# Patient Record
Sex: MALE | Race: WHITE | NOT HISPANIC OR LATINO | Employment: FULL TIME | URBAN - METROPOLITAN AREA
[De-identification: names, ages, dates, MRNs, and addresses within clinical notes are randomized per-mention and may not be internally consistent; named-entity substitution may affect disease eponyms.]

---

## 2020-06-16 ENCOUNTER — HOSPITAL ENCOUNTER (EMERGENCY)
Facility: HOSPITAL | Age: 30
Discharge: HOME | End: 2020-06-16
Attending: EMERGENCY MEDICINE
Payer: COMMERCIAL

## 2020-06-16 VITALS
DIASTOLIC BLOOD PRESSURE: 88 MMHG | HEART RATE: 99 BPM | TEMPERATURE: 98 F | SYSTOLIC BLOOD PRESSURE: 136 MMHG | BODY MASS INDEX: 22.96 KG/M2 | WEIGHT: 155 LBS | RESPIRATION RATE: 20 BRPM | HEIGHT: 69 IN | OXYGEN SATURATION: 99 %

## 2020-06-16 DIAGNOSIS — A08.4 VIRAL GASTROENTERITIS: Primary | ICD-10-CM

## 2020-06-16 LAB
ALBUMIN SERPL BCP-MCNC: 4.5 G/DL (ref 3.5–5.2)
ALP SERPL-CCNC: 67 U/L (ref 55–135)
ALT SERPL W/O P-5'-P-CCNC: 31 U/L (ref 10–44)
ANION GAP SERPL CALC-SCNC: 12 MMOL/L (ref 8–16)
AST SERPL-CCNC: 26 U/L (ref 10–40)
BASOPHILS # BLD AUTO: 0.03 K/UL (ref 0–0.2)
BASOPHILS NFR BLD: 0.2 % (ref 0–1.9)
BILIRUB SERPL-MCNC: 1.2 MG/DL (ref 0.1–1)
BUN SERPL-MCNC: 11 MG/DL (ref 6–20)
CALCIUM SERPL-MCNC: 9.2 MG/DL (ref 8.7–10.5)
CHLORIDE SERPL-SCNC: 98 MMOL/L (ref 95–110)
CO2 SERPL-SCNC: 25 MMOL/L (ref 23–29)
CREAT SERPL-MCNC: 0.9 MG/DL (ref 0.5–1.4)
DIFFERENTIAL METHOD: ABNORMAL
EOSINOPHIL # BLD AUTO: 0 K/UL (ref 0–0.5)
EOSINOPHIL NFR BLD: 0.1 % (ref 0–8)
ERYTHROCYTE [DISTWIDTH] IN BLOOD BY AUTOMATED COUNT: 12 % (ref 11.5–14.5)
EST. GFR  (AFRICAN AMERICAN): >60 ML/MIN/1.73 M^2
EST. GFR  (NON AFRICAN AMERICAN): >60 ML/MIN/1.73 M^2
GLUCOSE SERPL-MCNC: 97 MG/DL (ref 70–110)
HCT VFR BLD AUTO: 45.6 % (ref 40–54)
HGB BLD-MCNC: 15.8 G/DL (ref 14–18)
IMM GRANULOCYTES # BLD AUTO: 0.03 K/UL (ref 0–0.04)
IMM GRANULOCYTES NFR BLD AUTO: 0.2 % (ref 0–0.5)
LIPASE SERPL-CCNC: 70 U/L (ref 4–60)
LYMPHOCYTES # BLD AUTO: 1.5 K/UL (ref 1–4.8)
LYMPHOCYTES NFR BLD: 11.3 % (ref 18–48)
MAGNESIUM SERPL-MCNC: 1.9 MG/DL (ref 1.6–2.6)
MCH RBC QN AUTO: 30.4 PG (ref 27–31)
MCHC RBC AUTO-ENTMCNC: 34.6 G/DL (ref 32–36)
MCV RBC AUTO: 88 FL (ref 82–98)
MONOCYTES # BLD AUTO: 1.3 K/UL (ref 0.3–1)
MONOCYTES NFR BLD: 10 % (ref 4–15)
NEUTROPHILS # BLD AUTO: 10.1 K/UL (ref 1.8–7.7)
NEUTROPHILS NFR BLD: 78.2 % (ref 38–73)
NRBC BLD-RTO: 0 /100 WBC
PLATELET # BLD AUTO: 256 K/UL (ref 150–350)
PMV BLD AUTO: 9.5 FL (ref 9.2–12.9)
POTASSIUM SERPL-SCNC: 3.7 MMOL/L (ref 3.5–5.1)
PROT SERPL-MCNC: 7.5 G/DL (ref 6–8.4)
RBC # BLD AUTO: 5.19 M/UL (ref 4.6–6.2)
SODIUM SERPL-SCNC: 135 MMOL/L (ref 136–145)
WBC # BLD AUTO: 12.95 K/UL (ref 3.9–12.7)

## 2020-06-16 PROCEDURE — 25000003 PHARM REV CODE 250: Performed by: NURSE PRACTITIONER

## 2020-06-16 PROCEDURE — 99284 EMERGENCY DEPT VISIT MOD MDM: CPT | Mod: 25

## 2020-06-16 PROCEDURE — 80053 COMPREHEN METABOLIC PANEL: CPT

## 2020-06-16 PROCEDURE — 83735 ASSAY OF MAGNESIUM: CPT

## 2020-06-16 PROCEDURE — 96360 HYDRATION IV INFUSION INIT: CPT

## 2020-06-16 PROCEDURE — 85025 COMPLETE CBC W/AUTO DIFF WBC: CPT

## 2020-06-16 PROCEDURE — 83690 ASSAY OF LIPASE: CPT

## 2020-06-16 RX ORDER — LOPERAMIDE HYDROCHLORIDE 2 MG/1
2 CAPSULE ORAL 4 TIMES DAILY PRN
Qty: 12 CAPSULE | Refills: 0 | Status: SHIPPED | OUTPATIENT
Start: 2020-06-16 | End: 2020-06-26

## 2020-06-16 RX ORDER — ONDANSETRON 4 MG/1
8 TABLET, FILM COATED ORAL 2 TIMES DAILY
COMMUNITY
End: 2020-06-16

## 2020-06-16 RX ORDER — ONDANSETRON 4 MG/1
4 TABLET, FILM COATED ORAL EVERY 6 HOURS PRN
Qty: 12 TABLET | Refills: 0 | Status: ON HOLD | OUTPATIENT
Start: 2020-06-16 | End: 2023-03-06

## 2020-06-16 RX ADMIN — SODIUM CHLORIDE 1000 ML: 0.9 INJECTION, SOLUTION INTRAVENOUS at 07:06

## 2020-06-16 NOTE — Clinical Note
Jg Rich was seen and treated in our emergency department on 6/16/2020.  He may return to work on 06/18/2020.       If you have any questions or concerns, please don't hesitate to call.      Chandra Warren, NP

## 2020-06-16 NOTE — ED PROVIDER NOTES
Encounter Date: 6/16/2020       History     Chief Complaint   Patient presents with    Diarrhea     Patient complaining of NVD that started today.    Vomiting     28yo male presents w/ c/o n/v/d that started around 3:30AM today; explains that he was treated at a local urgent care for constipation over the weekend, denies exac factors otherwise, denies allev factors    Denies fever, headache, dizziness, syncope, vision changes, neck pain, painful/difficult swallowing, chest pain, shortness breath, cough, abdominal pain, hematuria/dysuria    No previous evaluation has been performed nor has PCP been contacted for today's concerns    Past medical/surgical history, allergies & current medications reviewed with patient    Known SARS-CoV2 exposure:  No    The history is provided by the patient. No  was used.     Review of patient's allergies indicates:  No Known Allergies  History reviewed. No pertinent past medical history.  Past Surgical History:   Procedure Laterality Date    APPENDECTOMY      TONSILLECTOMY      TYMPANOSTOMY TUBE PLACEMENT       History reviewed. No pertinent family history.  Social History     Tobacco Use    Smoking status: Current Every Day Smoker   Substance Use Topics    Alcohol use: Never     Frequency: Never    Drug use: Yes     Types: Marijuana     Review of Systems   Constitutional: Negative for fever.   HENT: Negative for sore throat.    Respiratory: Negative for shortness of breath.    Cardiovascular: Negative for chest pain.   Gastrointestinal: Positive for diarrhea, nausea and vomiting. Negative for abdominal distention, abdominal pain and constipation.   Genitourinary: Negative for dysuria.   Musculoskeletal: Negative for back pain.   Skin: Negative for rash.   Neurological: Negative for weakness.   Hematological: Does not bruise/bleed easily.   All other systems reviewed and are negative.      Physical Exam     Initial Vitals [06/16/20 1846]   BP Pulse Resp  Temp SpO2   136/88 99 20 98.1 °F (36.7 °C) 99 %      MAP       --         Physical Exam    Nursing note and vitals reviewed.  Constitutional: He appears well-developed. He does not appear ill. No distress.   AF, VSS   HENT:   Head: Normocephalic.   Right Ear: External ear normal.   Left Ear: External ear normal.   Nose: Nose normal.   Eyes: Lids are normal.   Neck: Neck supple.   Cardiovascular: Normal rate.   Pulmonary/Chest: Effort normal. No respiratory distress.   Abdominal: Soft. Bowel sounds are normal. He exhibits no distension. There is no abdominal tenderness.   Neurological: He is alert.   Skin: No rash noted.   Psychiatric: He has a normal mood and affect.         ED Course   Procedures  Labs Reviewed   CBC W/ AUTO DIFFERENTIAL - Abnormal; Notable for the following components:       Result Value    WBC 12.95 (*)     Gran # (ANC) 10.1 (*)     Mono # 1.3 (*)     Gran% 78.2 (*)     Lymph% 11.3 (*)     All other components within normal limits   COMPREHENSIVE METABOLIC PANEL - Abnormal; Notable for the following components:    Sodium 135 (*)     Total Bilirubin 1.2 (*)     All other components within normal limits   LIPASE - Abnormal; Notable for the following components:    Lipase 70 (*)     All other components within normal limits   MAGNESIUM          Imaging Results    None          Medical Decision Making:   ED Management:  Lab results reviewed:  No critical findings    Medications given:  IVFs    Findings and plan of care discussed with patient:  Viral gastroenteritis; will Rx Zofran & Imodium, care instructions given -- further instructions given to follow-up with PCP    All questions answered, strict return precautions given, patient verbalized understanding to all instructions, pleasant visit -- vital signs stable, patient is in no distress at discharge    Disclaimer:  This note was prepared with [employer] voice recognition transcription software. Garbled syntax, mangled pronouns, and  other bizarre constructions may be attributed to that software system.                                   Clinical Impression:       ICD-10-CM ICD-9-CM   1. Viral gastroenteritis  A08.4 008.8             ED Disposition Condition    Discharge Stable        ED Prescriptions     Medication Sig Dispense Start Date End Date Auth. Provider    ondansetron (ZOFRAN) 4 MG tablet Take 1 tablet (4 mg total) by mouth every 6 (six) hours as needed for Nausea. 12 tablet 6/16/2020  Chandra Warren NP    loperamide (IMODIUM) 2 mg capsule Take 1 capsule (2 mg total) by mouth 4 (four) times daily as needed for Diarrhea. 12 capsule 6/16/2020 6/26/2020 Chandra Warren NP        Follow-up Information     Follow up With Specialties Details Why Contact Info    PCP  Go to  In 2-3 days if symptoms not improving                                      Chandra Warren NP  06/16/20 1932

## 2020-06-17 NOTE — DISCHARGE INSTRUCTIONS
Take all medications as prescribed.  Follow-up with your primary care provider as discussed.  Please remember that you had a visit to the emergency room today and this does not substitute as primary care services for ongoing management because emergency services is a snap shot in time.  Should you have any worsening condition that requires emergency services do not hesitate to return to the ER.    COVID-19 TESTING  IF YOU DESIRE TO BE TESTED, CALL TO SCHEDULE AN APPOINTMENT:  Hot Line [phone]  [address] [place] [address], [place] [id number]  [facility]  Hours 8am-5pm Monday Through Friday

## 2022-06-28 ENCOUNTER — CLINICAL SUPPORT (OUTPATIENT)
Dept: INTERNAL MEDICINE | Facility: CLINIC | Age: 32
End: 2022-06-28

## 2022-06-28 ENCOUNTER — HOSPITAL ENCOUNTER (OUTPATIENT)
Dept: RADIOLOGY | Facility: HOSPITAL | Age: 32
Discharge: HOME | End: 2022-06-28
Attending: PHYSICIAN ASSISTANT

## 2022-06-28 DIAGNOSIS — Z02.1 PRE-EMPLOYMENT EXAMINATION: Primary | ICD-10-CM

## 2022-06-28 PROCEDURE — 71045 X-RAY EXAM CHEST 1 VIEW: CPT | Mod: TC,FY,PN

## 2022-06-28 PROCEDURE — 94010 BREATHING CAPACITY TEST: CPT | Mod: ,,, | Performed by: PHYSICIAN ASSISTANT

## 2022-06-28 PROCEDURE — 99499 UNLISTED E&M SERVICE: CPT | Mod: ,,, | Performed by: PHYSICIAN ASSISTANT

## 2022-06-28 PROCEDURE — 99080 SPECIAL REPORTS OR FORMS: CPT | Mod: ,,, | Performed by: PHYSICIAN ASSISTANT

## 2022-06-28 PROCEDURE — 92552 PURE TONE AUDIOMETRY AIR: CPT | Mod: ,,, | Performed by: PHYSICIAN ASSISTANT

## 2022-06-28 PROCEDURE — 99499 PHYSICAL, BASIC COMPLEXITY: ICD-10-PCS | Mod: ,,, | Performed by: PHYSICIAN ASSISTANT

## 2022-06-28 PROCEDURE — 94010 PULMONARY FUNCTION SCREENING (OCC MED PHYSICALS): ICD-10-PCS | Mod: ,,, | Performed by: PHYSICIAN ASSISTANT

## 2022-06-28 PROCEDURE — 71045 XR CHEST 1 VIEW: ICD-10-PCS | Mod: 26,,, | Performed by: RADIOLOGY

## 2022-06-28 PROCEDURE — 71045 X-RAY EXAM CHEST 1 VIEW: CPT | Mod: 26,,, | Performed by: RADIOLOGY

## 2022-06-28 PROCEDURE — 92552 AUDIOGRAM OCC MED: ICD-10-PCS | Mod: ,,, | Performed by: PHYSICIAN ASSISTANT

## 2022-06-28 PROCEDURE — 99080 OSHA QUESTIONNAIRE: ICD-10-PCS | Mod: ,,, | Performed by: PHYSICIAN ASSISTANT

## 2022-10-20 ENCOUNTER — OCCUPATIONAL HEALTH (OUTPATIENT)
Dept: URGENT CARE | Facility: CLINIC | Age: 32
End: 2022-10-20
Payer: COMMERCIAL

## 2022-10-20 VITALS
WEIGHT: 185 LBS | BODY MASS INDEX: 27.4 KG/M2 | OXYGEN SATURATION: 100 % | SYSTOLIC BLOOD PRESSURE: 128 MMHG | HEIGHT: 69 IN | HEART RATE: 90 BPM | DIASTOLIC BLOOD PRESSURE: 88 MMHG

## 2022-10-20 DIAGNOSIS — Z13.9 ENCOUNTER FOR SCREENING: Primary | ICD-10-CM

## 2022-10-20 PROCEDURE — 99002 MASK FIT-QUANTITATIVE: ICD-10-PCS | Mod: ,,, | Performed by: PHYSICIAN ASSISTANT

## 2022-10-20 PROCEDURE — 99002 DEVICE HANDLING PHYS/QHP: CPT | Mod: ,,, | Performed by: PHYSICIAN ASSISTANT

## 2023-01-24 ENCOUNTER — OFFICE VISIT (OUTPATIENT)
Dept: UROLOGY | Facility: CLINIC | Age: 33
End: 2023-01-24
Payer: COMMERCIAL

## 2023-01-24 ENCOUNTER — TELEPHONE (OUTPATIENT)
Dept: UROLOGY | Facility: CLINIC | Age: 33
End: 2023-01-24
Payer: COMMERCIAL

## 2023-01-24 VITALS
BODY MASS INDEX: 28 KG/M2 | SYSTOLIC BLOOD PRESSURE: 128 MMHG | DIASTOLIC BLOOD PRESSURE: 81 MMHG | HEART RATE: 64 BPM | HEIGHT: 69 IN | WEIGHT: 189.06 LBS

## 2023-01-24 DIAGNOSIS — Z30.09 STERILIZATION CONSULT: Primary | ICD-10-CM

## 2023-01-24 PROCEDURE — 1159F PR MEDICATION LIST DOCUMENTED IN MEDICAL RECORD: ICD-10-PCS | Mod: CPTII,S$GLB,, | Performed by: UROLOGY

## 2023-01-24 PROCEDURE — 99204 PR OFFICE/OUTPT VISIT, NEW, LEVL IV, 45-59 MIN: ICD-10-PCS | Mod: S$GLB,,, | Performed by: UROLOGY

## 2023-01-24 PROCEDURE — 99999 PR PBB SHADOW E&M-EST. PATIENT-LVL IV: CPT | Mod: PBBFAC,,, | Performed by: UROLOGY

## 2023-01-24 PROCEDURE — 3079F DIAST BP 80-89 MM HG: CPT | Mod: CPTII,S$GLB,, | Performed by: UROLOGY

## 2023-01-24 PROCEDURE — 1160F RVW MEDS BY RX/DR IN RCRD: CPT | Mod: CPTII,S$GLB,, | Performed by: UROLOGY

## 2023-01-24 PROCEDURE — 3079F PR MOST RECENT DIASTOLIC BLOOD PRESSURE 80-89 MM HG: ICD-10-PCS | Mod: CPTII,S$GLB,, | Performed by: UROLOGY

## 2023-01-24 PROCEDURE — 3008F PR BODY MASS INDEX (BMI) DOCUMENTED: ICD-10-PCS | Mod: CPTII,S$GLB,, | Performed by: UROLOGY

## 2023-01-24 PROCEDURE — 3074F SYST BP LT 130 MM HG: CPT | Mod: CPTII,S$GLB,, | Performed by: UROLOGY

## 2023-01-24 PROCEDURE — 1159F MED LIST DOCD IN RCRD: CPT | Mod: CPTII,S$GLB,, | Performed by: UROLOGY

## 2023-01-24 PROCEDURE — 3008F BODY MASS INDEX DOCD: CPT | Mod: CPTII,S$GLB,, | Performed by: UROLOGY

## 2023-01-24 PROCEDURE — 99999 PR PBB SHADOW E&M-EST. PATIENT-LVL IV: ICD-10-PCS | Mod: PBBFAC,,, | Performed by: UROLOGY

## 2023-01-24 PROCEDURE — 1160F PR REVIEW ALL MEDS BY PRESCRIBER/CLIN PHARMACIST DOCUMENTED: ICD-10-PCS | Mod: CPTII,S$GLB,, | Performed by: UROLOGY

## 2023-01-24 PROCEDURE — 99204 OFFICE O/P NEW MOD 45 MIN: CPT | Mod: S$GLB,,, | Performed by: UROLOGY

## 2023-01-24 PROCEDURE — 3074F PR MOST RECENT SYSTOLIC BLOOD PRESSURE < 130 MM HG: ICD-10-PCS | Mod: CPTII,S$GLB,, | Performed by: UROLOGY

## 2023-01-24 RX ORDER — LIDOCAINE AND PRILOCAINE 25; 25 MG/G; MG/G
CREAM TOPICAL
Qty: 30 G | Refills: 0 | Status: ON HOLD | OUTPATIENT
Start: 2023-01-24 | End: 2023-03-06 | Stop reason: HOSPADM

## 2023-01-24 RX ORDER — SULFAMETHOXAZOLE AND TRIMETHOPRIM 800; 160 MG/1; MG/1
1 TABLET ORAL 2 TIMES DAILY
Qty: 10 TABLET | Refills: 0 | Status: SHIPPED | OUTPATIENT
Start: 2023-01-24 | End: 2023-01-27

## 2023-01-24 RX ORDER — LIDOCAINE HYDROCHLORIDE 10 MG/ML
10 INJECTION INFILTRATION; PERINEURAL ONCE
Status: CANCELLED | OUTPATIENT
Start: 2023-01-24 | End: 2023-01-24

## 2023-01-24 RX ORDER — ACETAMINOPHEN 500 MG
1000 TABLET ORAL
Status: CANCELLED | OUTPATIENT
Start: 2023-01-24

## 2023-01-24 RX ORDER — LORAZEPAM 0.5 MG/1
1 TABLET ORAL
Status: DISCONTINUED | OUTPATIENT
Start: 2023-01-24 | End: 2023-03-02

## 2023-01-24 NOTE — TELEPHONE ENCOUNTER
----- Message from Kirsten Moreno sent at 1/24/2023  1:05 PM CST -----  Contact: self  Type:  Needs Medical Advice    Who Called: self    Would the patient rather a call back or a response via [facility]? call  Best Call Back Number: [phone] (home)     Additional Information: pt said they might be a lil late to appt. Please advise and thank you.

## 2023-01-24 NOTE — PROGRESS NOTES
Chief Complaint: Desired infertility  PCP: Primary Doctor No  Date of Service: 01/24/2023        Jg Rich is a 32 y.o. male who desires to have a vasectomy.    He has 3 children, ages 3,2 and one on the way.  He is .  His wife is 30 old. Wife's form of contraception: none    He is certain he does not want further children.  He is aware of other forms of contraception and understands that vasectomy should be considered permanent.    He has no other urologic complaints none  Any history of skin abscesses: staph infection on elbow good.   Any history of diabetes: no, No results found for: LABA1C, HGBA1C    Work activity level/Occupation: . Walking and stairs.   Anticoagulation/BC powder/Goody's Powder: takes occasionally.     Father with h/o stones. Pt no h/o stones.       Review of Systems: neg     PHYSICAL EXAM:  Vitals:    01/24/23 1334   BP: 128/81   Pulse: 64       Genitourinary: Penis is normal with no evidence of plaques or induration. Urethral meatus is normal.  Scrotum is normal. Testes are descended bilaterally with no evidence of abnormal masses or tenderness. Epididymis and cord structures are normal bilaterally.  Vas: bilateral vas are easily palpable     Assesment:   Jg Rich is a 32 y.o. male with     1. Sterilization consult          Plan:     Special notes about this patient/issues unrelated to vasectomy/follow-up  Scheduled for vas on march 6  Emla cream - bring with you - apply 15 min before procedure, once yiou get there.   Bactrim (sulfa)twice a day x 5d starting night before. Take with probiotic. H/o staph as a child.   Glucose normal on bmp in 2020. Do not need to repeat.   Ativan 1mg preop.        His vasectomy is scheduled at the ambulatory surgical center for Monday, march 6.  The nurses from the ASC call him the Friday before to tell him the time to be there for Monday.  He will need someone to drive him.   He does not have to fast for the  procedure    He is not on any anticoagulation/aspirin.   He will be given ativan 1mg 30 minutes prior to procedure  He will start antibiotics (bactrim)  the night before and take twice a day for 5 days. While on doxycycline he was told he should remain out of the sun or at least were long sleeves in long pants were with sun block.     He was counseled to take off at least 4-7 days of work and avoid any strenuous activity    1.The risks and benefits of vasectomy were discussed with the patient in detail.  The risks include bleeding, infection, pain, scarring, chronic pain, sperm granuloma, recannulization (1/2000) and loss of testicular function. We discussed risk of not obtaining both vas (one from right and one from left and that the specimen will be sent for pathology). If one of the specimen is not the vas may need another procedure to find the vas. The patient was informed that the long term effects of vasectomy are unknown and could be associated with a higher risk of prostate cancer. We discussed that vasovasotomy does not guarantee return of fertility if he decides at any point to do a reversal.   2.We discussed that he should stop aspirin, fish oil, and any other blood thinners at least one week prior to the procedure. Acetominophen is okay to use for headaches, pain, etc.  3. We discussed that he must continue to use contraception until a negative semen analysis is obtained 8 weeks later or 20 ejaculates later, whichever is first. He will bring the semen sample to the hospital. If the first one is negative, will not repeat.  If it shows sperm he may need to repeat a few weeks later. He should receive a cup and instructions on how to provide the semen analysis prior to discharge from the ASC/vasectomy.  4. A 1 week follow-up will be made but if he is doing well he can cancel this appointment.     Prescriptions to be given:   bactrim (antibiotic) - to be prescribed before procedure  I will also prescribe  bactrim  twice a day starting the morning of the procedure before he comes in for 5 days.   Norco 5/325 (pain medicine) - will give after procedure  [place] 5/325, disp 10.  Alternate Tylenol and ibuprofen after the procedure for pain.  If pain persists then take Norco for breakthrough pain.

## 2023-01-24 NOTE — PATIENT INSTRUCTIONS
Special notes about this patient/issues unrelated to vasectomy/follow-up  Scheduled for vas on march 6  Emla cream - bring with you - apply 15 min before procedure, once yiou get there.   Bactrim (sulfa)twice a day x 5d starting night before. Take with probiotic. H/o staph as a child.   Glucose normal on bmp in 2020. Do not need to repeat.   Ativan 1mg preop.    His vasectomy is scheduled at the ambulatory surgical center for Monday, march 6.  The nurses from the [facility] call him the Friday before to tell him the time to be there for Monday.  He will need someone to drive him.   He does not have to fast for the procedure    He is not on any anticoagulation/aspirin.   He will be given ativan 1mg 30 minutes prior to procedure  He will start antibiotics (bactrim)  the night before and take twice a day for 5 days. While on doxycycline he was told he should remain out of the sun or at least were long sleeves in long pants were with sun block.     He was counseled to take off at least 4-7 days of work and avoid any strenuous activity    1.The risks and benefits of vasectomy were discussed with the patient in detail.  The risks include bleeding, infection, pain, scarring, chronic pain, sperm granuloma, recannulization (1/2000) and loss of testicular function. We discussed risk of not obtaining both vas (one from right and one from left and that the specimen will be sent for pathology). If one of the specimen is not the vas may need another procedure to find the vas. The patient was informed that the long term effects of vasectomy are unknown and could be associated with a higher risk of prostate cancer. We discussed that vasovasotomy does not guarantee return of fertility if he decides at any point to do a reversal.   2.We discussed that he should stop aspirin, fish oil, and any other blood thinners at least one week prior to the procedure. Acetominophen is okay to use for headaches, pain, etc.  3. We discussed that he must  continue to use contraception until a negative semen analysis is obtained 8 weeks later or 20 ejaculates later, whichever is first. He will bring the semen sample to the hospital. If the first one is negative, will not repeat.  If it shows sperm he may need to repeat a few weeks later. He should receive a cup and instructions on how to provide the semen analysis prior to discharge from the ASC/vasectomy.  4. A 1 week follow-up will be made but if he is doing well he can cancel this appointment.     Prescriptions to be given:   bactrim (antibiotic) - to be prescribed before procedure  I will also prescribe bactrim  twice a day starting the morning of the procedure before he comes in for 5 days.   Norco 5/325 (pain medicine) - will give after procedure  [place] 5/325, disp 10.  Alternate Tylenol and ibuprofen after the procedure for pain.  If pain persists then take Norco for breakthrough pain.

## 2023-03-02 NOTE — DISCHARGE INSTRUCTIONS
Post Vasectomy Instructions    ** Todays procedure will not immediately prevent you from being fertile.  You will need to provide a semen sample at 8 weeks or after 20 ejaculations to ensure no sperm are present.     Please carefully read and follow these instructions:    Plan to return straight home and limit your activity for the next 24 hours  After arriving home, use an ice pack on your scrotum for 2-3 hrs to decrease the swelling risk. (tip- a frozen pack of peas works will and will conform to the area)  Take 2 Tylenol every 4  hours as needed for pain relief and alternate with Ibuprofen 400mg  Begin wearing an athletic supporter and continue wearing it for 2-3 days or longer if needed for comfort  You may resume normal bathing after 24 hrs  Bruising and light drainage from the incision, mild swelling and scrotal tenderness are common after a vasectomy and usually resolve after several days.  It is possible that the edges of  the incision may separate  Stitches usually dissolve within 10 days.    When planning activities following your vasectomy it is important to follow the guidelines below for 1 week:  No work or sports requiring lifting greater than 20 pounds (including recreational weight lifting)  No work or sports activity requiring pushing, straining or running / jogging  No tennis, golf, basketball or any other sport  No pushing a lawnmower  No straddling a bicycle, motorcycle, snowmobile, ATV, horse or riding lawnmower  No sexual relations and do not resume even after a week if you are experiencing any discomfort.. Ejaculating too soon after a vasectomy may increase the chances of complications including the rejoining of the tubes.    Call you Physician if you experience any of the following  Symptoms:  Severe Scrotal Swelling  Fevers and / or Chills within a week following your vasectomy  Scrotal Pain not controlled with medication  Foul smelling drainage from the vasectomy incision

## 2023-03-06 ENCOUNTER — HOSPITAL ENCOUNTER (OUTPATIENT)
Facility: AMBULARY SURGERY CENTER | Age: 33
Discharge: HOME | End: 2023-03-06
Attending: UROLOGY | Admitting: UROLOGY
Payer: COMMERCIAL

## 2023-03-06 DIAGNOSIS — Z30.09 STERILIZATION CONSULT: ICD-10-CM

## 2023-03-06 LAB
BILIRUBIN, UA POC OHS: NEGATIVE
BLOOD, UA POC OHS: ABNORMAL
CLARITY, UA POC OHS: CLEAR
COLOR, UA POC OHS: YELLOW
GLUCOSE, UA POC OHS: NEGATIVE
KETONES, UA POC OHS: NEGATIVE
LEUKOCYTES, UA POC OHS: NEGATIVE
NITRITE, UA POC OHS: NEGATIVE
PH, UA POC OHS: 6
PROTEIN, UA POC OHS: NEGATIVE
SPECIFIC GRAVITY, UA POC OHS: 1.01
UROBILINOGEN, UA POC OHS: 0.2

## 2023-03-06 PROCEDURE — 88302 PR  SURG PATH,LEVEL II: ICD-10-PCS | Mod: 26,,, | Performed by: PATHOLOGY

## 2023-03-06 PROCEDURE — 55250 REMOVAL OF SPERM DUCT(S): CPT | Performed by: UROLOGY

## 2023-03-06 PROCEDURE — 88302 TISSUE EXAM BY PATHOLOGIST: CPT | Mod: 26,,, | Performed by: PATHOLOGY

## 2023-03-06 PROCEDURE — 55250 PR REMOVAL OF SPERM DUCT(S): ICD-10-PCS | Mod: ,,, | Performed by: UROLOGY

## 2023-03-06 PROCEDURE — 55250 REMOVAL OF SPERM DUCT(S): CPT | Mod: ,,, | Performed by: UROLOGY

## 2023-03-06 RX ORDER — LIDOCAINE HYDROCHLORIDE 10 MG/ML
INJECTION, SOLUTION EPIDURAL; INFILTRATION; INTRACAUDAL; PERINEURAL
Status: DISCONTINUED | OUTPATIENT
Start: 2023-03-06 | End: 2023-03-06 | Stop reason: HOSPADM

## 2023-03-06 RX ORDER — TRAMADOL HYDROCHLORIDE 50 MG/1
50 TABLET ORAL EVERY 6 HOURS PRN
Qty: 5 TABLET | Refills: 0 | Status: SHIPPED | OUTPATIENT
Start: 2023-03-06 | End: 2023-07-31

## 2023-03-06 RX ORDER — ALPRAZOLAM 1 MG/1
1 TABLET, ORALLY DISINTEGRATING ORAL
Status: COMPLETED | OUTPATIENT
Start: 2023-03-06 | End: 2023-03-06

## 2023-03-06 RX ORDER — SODIUM CHLORIDE 0.9 G/100ML
IRRIGANT IRRIGATION
Status: DISCONTINUED | OUTPATIENT
Start: 2023-03-06 | End: 2023-03-06 | Stop reason: HOSPADM

## 2023-03-06 RX ORDER — ACETAMINOPHEN 325 MG/1
975 TABLET ORAL
Status: COMPLETED | OUTPATIENT
Start: 2023-03-06 | End: 2023-03-06

## 2023-03-06 RX ORDER — LIDOCAINE HYDROCHLORIDE 10 MG/ML
10 INJECTION INFILTRATION; PERINEURAL ONCE
Status: DISCONTINUED | OUTPATIENT
Start: 2023-03-06 | End: 2023-03-06 | Stop reason: HOSPADM

## 2023-03-06 RX ADMIN — ACETAMINOPHEN 975 MG: 325 TABLET ORAL at 01:03

## 2023-03-06 RX ADMIN — ALPRAZOLAM 1 MG: 1 TABLET, ORALLY DISINTEGRATING ORAL at 01:03

## 2023-03-06 NOTE — PLAN OF CARE
Pt AAOx4, VSS. D/C instructions reviewed with patient and wife. Sample cup and brown bag provided for patient's specimen in 8 weeks

## 2023-03-06 NOTE — H&P
Chief Complaint: Desired infertility  PCP: Primary Doctor No  Date of Service: 03/06/2023        Jg Rich is a 32 y.o. male who desires to have a vasectomy.    He has 3 children, ages 3,2 and one on the way.  He is .  His wife is 30 old. Wife's form of contraception: none    He is certain he does not want further children.  He is aware of other forms of contraception and understands that vasectomy should be considered permanent.    He has no other urologic complaints none  Any history of skin abscesses: staph infection on elbow good.   Any history of diabetes: no, No results found for: LABA1C, HGBA1C    Work activity level/Occupation: . Walking and stairs.   Anticoagulation/BC powder/Goody's Powder: takes occasionally.     Father with h/o stones. Pt no h/o stones.     Interval history 3/6/23:  Here today for vas      Review of Systems: neg     PHYSICAL EXAM:  Vitals:    03/06/23 1258   Resp: 18   Temp: 98.2 °F (36.8 °C)       General:wdwn  in NAD  Neurologic: CN grossly normal. Normal sensation.   Psychiatric: awake, alert and oriented x 3. Mood and affect Normal. Cooperative.  Eyes: PERRLA, normal conjunctiva  Respiratory: no increased work on breathing. No wheezing.   Cardiovascular: No obvious extremity edema. Warm and well perfused.  GI: no obvious stomach distension  Musculoskeletal: normal  range of motion of bilateral upper extremities. Normal muscle strength and tone.  Skin: no obvious rashes or lesions. No tightening of skin noted.    Pertinent  exam in HPI    Gu exam 1/24/23:  Genitourinary: Penis is normal with no evidence of plaques or induration. Urethral meatus is normal.  Scrotum is normal. Testes are descended bilaterally with no evidence of abnormal masses or tenderness. Epididymis and cord structures are normal bilaterally.  Vas: bilateral vas are easily palpable     Recent Labs   Lab 06/16/20  1902   WBC 12.95 H   Hemoglobin 15.8   Hematocrit 45.6   Platelets  256   ]  Recent Labs   Lab 06/16/20  1902   Sodium 135 L   Potassium 3.7   Chloride 98   CO2 25   BUN 11   Creatinine 0.9   Glucose 97   Calcium 9.2   Magnesium 1.9   Alkaline Phosphatase 67   Total Protein 7.5   Albumin 4.5   Total Bilirubin 1.2 H   AST 26   ALT 31   ]    No results found for: LABA1C, HGBA1C      Assesment:   Jg Rich is a 32 y.o. male with     1. Sterilization consult          Plan:     Special notes about this patient/issues unrelated to vasectomy/follow-up  Scheduled for vas on march 6  Emla cream - bring with you - apply 15 min before procedure, once yiou get there.   Bactrim (sulfa)twice a day x 5d starting night before. Take with probiotic. H/o staph as a child.   Glucose normal on bmp in 2020. Do not need to repeat.   Ativan 1mg preop.     I have explained the risks, risks, benefits, and alternatives of the procedure in detail.  The patient voices understanding and all questions have been answered.  The patient agrees to proceed as planned.    Risks of Vasectomy   The risks of complication after vasectomy are very low. A few of the risks include:  Bleeding.  Infection.  Scrotal hematoma - a collection of blood in the scrotum.  Inflammation of the epididymis - inflammation of a structure next to the testicle that helps in maturation of the sperm.  Sperm granuloma - a collection of sperm that leaks out from the vas deferens, forming a small nodule or lump. This does not usually cause any discomfort, but you may feel it in the scrotum.  Recanalization - the restoration of the lumen or transport tube between the two ends of the vas deferens, possibly causing fertility.    This patient has been cleared for surgery in ambulatory surgical facility.

## 2023-03-06 NOTE — OP NOTE
[facility] without anesthesia  Procedure Note    Date: 03/06/2023      Pre-procedure diagnosis: undesired fertility    Post-procedure diagnosis: same    Procedures:  1.  Vasectomy    Surgeon: Carri Chery MD    Assistant: see records    Anesthesia: 1% lidocaine    Procedure performed: Bilateral vasectomy     Blood loss: Min    Specimen:   Right vas  Left vas    Findings: right side normal. Left side with some bleeding requiring fulguration.     Procedure in detail:      After informed consent was obtained, the patient was brought to the procedure room and placed in the supine position.     He was prepped an draped in a normal and sterile fashion.     The vas was isolated on the right side.  Local anesthesia was injected with 1% lidocaine. The skin overlying the vas was incised sharply. The vas was then isolated and grasped with ringed forceps. It was brought through the incision. The adventia overlying the vas was incised sharply. The vas was double clamped about 1cm apart with hemostats. Lidocaine was used to inject proximal and distal to each clamp. A piece of the vas was cut and sent for pathology.  Electrocautery was used to obtain hemostasis and to cauterize the vas proximal and distal to each clamp.   The proximal and distal  vas was then tied with 3-0 chromic and one end of the vas buried.     The vas was then placed back into the incision with a hemostat attached so we could inspect for bleeding at a later point.     Attention was then turned to the left hemiscrotum and the exact same procedure was done.     I then went back to the right side and checked for bleeding. There was no bleeding noted and the incision was closed with skin glue. The same was repeated on the patient's left.     He tolerated the procedure well     Disposition:    Semen analysis: He will return for a post vas semen analysis- 8 weeks or 20 ejaculations later (instructions provided on discharge  instructions on how and when to drop off analysis)  Contraception: He was advised to continue contraception until he brings in a semen sample that show no sperm.  Instructions  He is also to avoid lifting, strenuous exercise, intercourse, NSAIDS, and ASA for 1 week.  He was given a cup here.   He was instructed to use ice as needed and tight fitting underwear  Medications:   prescribed Tramadol/Ultram today to take every 6 hours as needed for pain if tylenol or ibuprofen not helping (dispense 5)  He was already prescribed antibiotics to take twice a day for 5 days starting yesterday  Clinic Follow up: Follow up in clinic for evaluation only if he has any pain, redness, swelling

## 2023-03-06 NOTE — DISCHARGE SUMMARY
[facility]  Urology  Discharge Note - Short Stay      Patient Name: Jg Rich  MRN: [id number]  Discharge Date and Time:  03/06/2023 3:10 PM  Attending Physician: Carri Edouard,*   Discharging Provider: Carri Edouard MD  Primary Care Physician: Primary Doctor No    There are no hospital problems to display for this patient.      Final Diagnoses: Same as principal problem.    Hospital Course: Patient was admitted for an outpatient procedure and tolerated the procedure well with no complications.*    Procedure(s) (LRB):  VASECTOMY (Bilateral)     Indwelling Lines/Drains at time of discharge:   Lines/Drains/Airways       None                   Discharged Condition: good    Disposition: home    Follow Up:      Patient Instructions:      POCT Semen Post Vasectomy       Medications:  Reconciled Home Medications:      Medication List        START taking these medications      traMADoL 50 mg tablet  Commonly known as: ULTRAM  Take 1 tablet (50 mg total) by mouth every 6 (six) hours as needed for Pain.            STOP taking these medications      LIDOcaine-prilocaine cream  Commonly known as: EMLA            ASK your doctor about these medications      MIRALAX ORAL  Take by mouth.              Discharge Procedure Orders (must include Diet, Follow-up, Activity):   Discharge Procedure Orders (must include Diet, Follow-up, Activity)   POCT Semen Post Vasectomy            Carri Edouard MD  Urology  [facility]

## 2023-03-06 NOTE — PATIENT INSTRUCTIONS
INSTRUCTIONS FOR PROVIDING SEMEN ANALYSIS POST-VASECTOMY  [facility] [address] [place] [address]     Semen Analysis Collection   The physician orders a semen analysis.   The patient obtains a sterile container from either the physician's office or from [facility].   The container is pre-warmed to body temperature by placing it against the body's surface for thirty minutes. CAUTION - Do not place the container in hot water, it must be body temperature.   The best method of collection of seminal fluid for routine semen analysis is masturbation directly into the clean, dry, warm container.   Care must be taken to include the entire specimen.   Please WRITE DOWN time of collection  It is recommended that the specimen be collected after a period of abstaining from intercourse or masturbation for at least 48 - 72 hours (about 3 days), but not more than 4 days.   Collection of specimens by either interrupted or uninterrupted coitus with or without condom is unacceptable.   After collection, maintain the specimen at body temperature by placing the container under the clothing against the body or by holding it in hands.   Transport the specimen directly to the laboratory as quickly as possible (within 30 - 45 minutes of collection). Time and temperature are critical.   Deliver specimen directly to the laboratory Monday - Friday during the hours of 6:00am - 11:30am. You will then be directed from there to the Outpatient Registration area. If you have any further questions concerning collection, please contact your physician or [facility] ([facility].       Other helpful information:  You should abstain from any sexual activity for 2 to 7 days prior to collection of the semen sample.  Ideally, it should be more than 2 days from a previous ejaculation and not more than 7 days.  Please label the container with your name (male), date of birth, and the time and date the  specimen was collected.  A semen sample should be collected only after you have washed your hands and penis with soap  and water, being sure to rinse away all of the soap residue. Dry thoroughly before collecting the sample.  The sample should be collected by masturbation directly into a sterile, clean and dry container provided by your physician or laboratory. You can pick one of our containers by stopping at our  laboratory or any outpatient service centers. Rastafari issues, collection via interrupted intercourse or use of fertility condoms should be discussed with your provider.  A. Lubricants should not be used to aid in the collection of the sperm as they may be toxic to sperm.  B. Condoms should not be used for semen collection because they contain agents that kill sperm.  C. Interrupted intercourse is not the preferred method of specimen collection as this may result ih the loss of the most critical portion of the ejaculate (first portion) and the specimen may be contaminated with cells or bacteria from the vagina.   D. If pubic hair or thread of clothing accidentally falls into the container, do not attempt to remove it. The lab will remove it using sterile techniques.  Be sure that all of the sample is added directly into the container. Close the container lid tightly to ensure the specimen does not leak. If a portion of the sample was lost during collection, please indicate this on the form the lab personnel will ask you to complete.  Please keep the specimen close to body temperature during transportation.  The specimen must be received at the laboratory within 1/2 hour of collection.

## 2023-03-07 VITALS
RESPIRATION RATE: 18 BRPM | BODY MASS INDEX: 27.98 KG/M2 | HEIGHT: 69 IN | SYSTOLIC BLOOD PRESSURE: 131 MMHG | OXYGEN SATURATION: 94 % | TEMPERATURE: 98 F | HEART RATE: 62 BPM | WEIGHT: 188.94 LBS | DIASTOLIC BLOOD PRESSURE: 77 MMHG

## 2023-03-09 LAB
FINAL PATHOLOGIC DIAGNOSIS: NORMAL
GROSS: NORMAL
Lab: NORMAL

## 2023-05-12 ENCOUNTER — LAB VISIT (OUTPATIENT)
Dept: LAB | Facility: HOSPITAL | Age: 33
End: 2023-05-12
Attending: UROLOGY
Payer: COMMERCIAL

## 2023-05-12 DIAGNOSIS — Z30.09 STERILIZATION CONSULT: ICD-10-CM

## 2023-05-12 DIAGNOSIS — Z30.09 STERILIZATION CONSULT: Primary | ICD-10-CM

## 2023-05-12 LAB
SPECIMEN VOL SMN: 2.1 ML
SPERM P VAS # SMN: NORMAL M/ML

## 2023-05-12 PROCEDURE — 89320 SEMEN ANAL VOL/COUNT/MOT: CPT | Performed by: UROLOGY

## 2023-07-31 ENCOUNTER — OFFICE VISIT (OUTPATIENT)
Dept: FAMILY MEDICINE | Facility: CLINIC | Age: 33
End: 2023-07-31
Payer: COMMERCIAL

## 2023-07-31 ENCOUNTER — LAB VISIT (OUTPATIENT)
Dept: LAB | Facility: HOSPITAL | Age: 33
End: 2023-07-31
Attending: FAMILY MEDICINE
Payer: COMMERCIAL

## 2023-07-31 VITALS
OXYGEN SATURATION: 98 % | DIASTOLIC BLOOD PRESSURE: 78 MMHG | HEIGHT: 69 IN | BODY MASS INDEX: 27.58 KG/M2 | SYSTOLIC BLOOD PRESSURE: 130 MMHG | RESPIRATION RATE: 16 BRPM | HEART RATE: 88 BPM | TEMPERATURE: 98 F | WEIGHT: 186.19 LBS

## 2023-07-31 DIAGNOSIS — Z00.00 ANNUAL PHYSICAL EXAM: ICD-10-CM

## 2023-07-31 DIAGNOSIS — Z76.89 ENCOUNTER TO ESTABLISH CARE: Primary | ICD-10-CM

## 2023-07-31 LAB
ALBUMIN SERPL BCP-MCNC: 4.7 G/DL (ref 3.5–5.2)
ALP SERPL-CCNC: 64 U/L (ref 55–135)
ALT SERPL W/O P-5'-P-CCNC: 43 U/L (ref 10–44)
ANION GAP SERPL CALC-SCNC: 11 MMOL/L (ref 8–16)
AST SERPL-CCNC: 35 U/L (ref 10–40)
BASOPHILS # BLD AUTO: 0.03 K/UL (ref 0–0.2)
BASOPHILS NFR BLD: 0.4 % (ref 0–1.9)
BILIRUB SERPL-MCNC: 0.6 MG/DL (ref 0.1–1)
BUN SERPL-MCNC: 21 MG/DL (ref 6–20)
CALCIUM SERPL-MCNC: 10.6 MG/DL (ref 8.7–10.5)
CHLORIDE SERPL-SCNC: 101 MMOL/L (ref 95–110)
CHOLEST SERPL-MCNC: 205 MG/DL (ref 120–199)
CHOLEST/HDLC SERPL: 3.5 {RATIO} (ref 2–5)
CO2 SERPL-SCNC: 24 MMOL/L (ref 23–29)
CREAT SERPL-MCNC: 0.9 MG/DL (ref 0.5–1.4)
DIFFERENTIAL METHOD: NORMAL
EOSINOPHIL # BLD AUTO: 0.1 K/UL (ref 0–0.5)
EOSINOPHIL NFR BLD: 0.8 % (ref 0–8)
ERYTHROCYTE [DISTWIDTH] IN BLOOD BY AUTOMATED COUNT: 12.2 % (ref 11.5–14.5)
EST. GFR  (NO RACE VARIABLE): >60 ML/MIN/1.73 M^2
ESTIMATED AVG GLUCOSE: 97 MG/DL (ref 68–131)
GLUCOSE SERPL-MCNC: 99 MG/DL (ref 70–110)
HBA1C MFR BLD: 5 % (ref 4–5.6)
HCT VFR BLD AUTO: 48.7 % (ref 40–54)
HCV AB SERPL QL IA: NORMAL
HDLC SERPL-MCNC: 58 MG/DL (ref 40–75)
HDLC SERPL: 28.3 % (ref 20–50)
HGB BLD-MCNC: 16 G/DL (ref 14–18)
HIV 1+2 AB+HIV1 P24 AG SERPL QL IA: NORMAL
IMM GRANULOCYTES # BLD AUTO: 0.01 K/UL (ref 0–0.04)
IMM GRANULOCYTES NFR BLD AUTO: 0.1 % (ref 0–0.5)
LDLC SERPL CALC-MCNC: 132.4 MG/DL (ref 63–159)
LYMPHOCYTES # BLD AUTO: 2.8 K/UL (ref 1–4.8)
LYMPHOCYTES NFR BLD: 33.4 % (ref 18–48)
MCH RBC QN AUTO: 28.6 PG (ref 27–31)
MCHC RBC AUTO-ENTMCNC: 32.9 G/DL (ref 32–36)
MCV RBC AUTO: 87 FL (ref 82–98)
MONOCYTES # BLD AUTO: 0.7 K/UL (ref 0.3–1)
MONOCYTES NFR BLD: 8.7 % (ref 4–15)
NEUTROPHILS # BLD AUTO: 4.7 K/UL (ref 1.8–7.7)
NEUTROPHILS NFR BLD: 56.6 % (ref 38–73)
NONHDLC SERPL-MCNC: 147 MG/DL
NRBC BLD-RTO: 0 /100 WBC
PLATELET # BLD AUTO: 299 K/UL (ref 150–450)
PMV BLD AUTO: 9.8 FL (ref 9.2–12.9)
POTASSIUM SERPL-SCNC: 3.9 MMOL/L (ref 3.5–5.1)
PROT SERPL-MCNC: 8 G/DL (ref 6–8.4)
RBC # BLD AUTO: 5.6 M/UL (ref 4.6–6.2)
SODIUM SERPL-SCNC: 136 MMOL/L (ref 136–145)
TRIGL SERPL-MCNC: 73 MG/DL (ref 30–150)
WBC # BLD AUTO: 8.26 K/UL (ref 3.9–12.7)

## 2023-07-31 PROCEDURE — 36415 COLL VENOUS BLD VENIPUNCTURE: CPT | Performed by: FAMILY MEDICINE

## 2023-07-31 PROCEDURE — 1159F PR MEDICATION LIST DOCUMENTED IN MEDICAL RECORD: ICD-10-PCS | Mod: S$GLB,,, | Performed by: FAMILY MEDICINE

## 2023-07-31 PROCEDURE — 3044F PR MOST RECENT HEMOGLOBIN A1C LEVEL <7.0%: ICD-10-PCS | Mod: S$GLB,,, | Performed by: FAMILY MEDICINE

## 2023-07-31 PROCEDURE — 1159F MED LIST DOCD IN RCRD: CPT | Mod: S$GLB,,, | Performed by: FAMILY MEDICINE

## 2023-07-31 PROCEDURE — 85025 COMPLETE CBC W/AUTO DIFF WBC: CPT | Performed by: FAMILY MEDICINE

## 2023-07-31 PROCEDURE — 3044F HG A1C LEVEL LT 7.0%: CPT | Mod: S$GLB,,, | Performed by: FAMILY MEDICINE

## 2023-07-31 PROCEDURE — 86803 HEPATITIS C AB TEST: CPT | Performed by: FAMILY MEDICINE

## 2023-07-31 PROCEDURE — 99999 PR PBB SHADOW E&M-EST. PATIENT-LVL III: CPT | Mod: PBBFAC,,, | Performed by: FAMILY MEDICINE

## 2023-07-31 PROCEDURE — 80061 LIPID PANEL: CPT | Performed by: FAMILY MEDICINE

## 2023-07-31 PROCEDURE — 3008F PR BODY MASS INDEX (BMI) DOCUMENTED: ICD-10-PCS | Mod: S$GLB,,, | Performed by: FAMILY MEDICINE

## 2023-07-31 PROCEDURE — 3008F BODY MASS INDEX DOCD: CPT | Mod: S$GLB,,, | Performed by: FAMILY MEDICINE

## 2023-07-31 PROCEDURE — 3075F PR MOST RECENT SYSTOLIC BLOOD PRESS GE 130-139MM HG: ICD-10-PCS | Mod: S$GLB,,, | Performed by: FAMILY MEDICINE

## 2023-07-31 PROCEDURE — 3078F DIAST BP <80 MM HG: CPT | Mod: S$GLB,,, | Performed by: FAMILY MEDICINE

## 2023-07-31 PROCEDURE — 3075F SYST BP GE 130 - 139MM HG: CPT | Mod: S$GLB,,, | Performed by: FAMILY MEDICINE

## 2023-07-31 PROCEDURE — 87389 HIV-1 AG W/HIV-1&-2 AB AG IA: CPT | Performed by: FAMILY MEDICINE

## 2023-07-31 PROCEDURE — 99999 PR PBB SHADOW E&M-EST. PATIENT-LVL III: ICD-10-PCS | Mod: PBBFAC,,, | Performed by: FAMILY MEDICINE

## 2023-07-31 PROCEDURE — 83036 HEMOGLOBIN GLYCOSYLATED A1C: CPT | Performed by: FAMILY MEDICINE

## 2023-07-31 PROCEDURE — 1160F PR REVIEW ALL MEDS BY PRESCRIBER/CLIN PHARMACIST DOCUMENTED: ICD-10-PCS | Mod: S$GLB,,, | Performed by: FAMILY MEDICINE

## 2023-07-31 PROCEDURE — 1160F RVW MEDS BY RX/DR IN RCRD: CPT | Mod: S$GLB,,, | Performed by: FAMILY MEDICINE

## 2023-07-31 PROCEDURE — 3078F PR MOST RECENT DIASTOLIC BLOOD PRESSURE < 80 MM HG: ICD-10-PCS | Mod: S$GLB,,, | Performed by: FAMILY MEDICINE

## 2023-07-31 PROCEDURE — 80053 COMPREHEN METABOLIC PANEL: CPT | Performed by: FAMILY MEDICINE

## 2023-07-31 PROCEDURE — 99385 PR PREVENTIVE VISIT,NEW,18-39: ICD-10-PCS | Mod: S$GLB,,, | Performed by: FAMILY MEDICINE

## 2023-07-31 PROCEDURE — 99385 PREV VISIT NEW AGE 18-39: CPT | Mod: S$GLB,,, | Performed by: FAMILY MEDICINE

## 2023-07-31 NOTE — PROGRESS NOTES
"[facility] - Clinic Note    Subjective      Mr. Rich is a 33 y.o. male who presents to clinic for Establish Care    No chronic medical conditions.  Family history of hypertension and diabetes.  Has been feeling well.    [facility] has no past medical history on file.   PSX Jg has a past surgical history that includes Appendectomy; Tonsillectomy; Tympanostomy tube placement; and Vasectomy (Bilateral, 3/6/2023).    Jg's family history is not on file.    Jg reports that he has quit smoking. His smoking use included cigarettes. He has never used smokeless tobacco. He reports current drug use. Drug: Marijuana. He reports that he does not drink alcohol.   ALG Jg has No Known Allergies.   MED Jg currently has no medications in their medication list.     Review of Systems   Constitutional:  Negative for chills and fever.   HENT:  Negative for congestion and rhinorrhea.    Eyes:  Negative for visual disturbance.   Respiratory:  Negative for cough and shortness of breath.    Cardiovascular:  Negative for chest pain.   Gastrointestinal:  Negative for abdominal pain, constipation, diarrhea, nausea and vomiting.   Genitourinary:  Negative for dysuria.   Musculoskeletal:  Negative for myalgias.   Skin:  Negative for rash.   Neurological:  Negative for weakness and headaches.     Objective     /78 (BP Location: Left arm, Patient Position: Sitting, BP Method: Large (Manual))   Pulse 88   Temp 98.1 °F (36.7 °C) (Temporal)   Resp 16   Ht 5' 9" (1.753 m)   Wt 84.5 kg (186 lb 3.2 oz)   SpO2 98%   BMI 27.50 kg/m²     Physical Exam  Vitals and nursing note reviewed.   Constitutional:       General: He is not in acute distress.     Appearance: Normal appearance. He is well-developed. He is not diaphoretic.   HENT:      Head: Normocephalic and atraumatic.      Right Ear: External ear normal.      Left Ear: External ear normal.   Eyes:      General:         Right eye: No discharge.         Left " eye: No discharge.   Cardiovascular:      Rate and Rhythm: Normal rate and regular rhythm.      Heart sounds: Normal heart sounds.   Pulmonary:      Effort: Pulmonary effort is normal.      Breath sounds: Normal breath sounds. No wheezing or rales.   Skin:     General: Skin is warm and dry.   Neurological:      Mental Status: He is alert and oriented to person, place, and time. Mental status is at baseline.   Psychiatric:         Mood and Affect: Mood normal.         Behavior: Behavior normal.         Thought Content: Thought content normal.         Judgment: Judgment normal.        Assessment/Plan     1. Encounter to establish care        2. Annual physical exam  Lipid Panel    Comprehensive Metabolic Panel    CBC Auto Differential    Hemoglobin A1C    HIV 1/2 Ag/Ab (4th Gen)    Hepatitis C Antibody        Will check yearly labs as above.  Had Tdap 4 years ago.    No future appointments.      Rodri Hayes MD  Family Medicine  [facility]

## 2024-03-28 ENCOUNTER — OFFICE VISIT (OUTPATIENT)
Dept: PODIATRY | Facility: CLINIC | Age: 34
End: 2024-03-28
Payer: COMMERCIAL

## 2024-03-28 VITALS
BODY MASS INDEX: 26.66 KG/M2 | DIASTOLIC BLOOD PRESSURE: 81 MMHG | HEART RATE: 76 BPM | SYSTOLIC BLOOD PRESSURE: 133 MMHG | WEIGHT: 180 LBS | HEIGHT: 69 IN

## 2024-03-28 DIAGNOSIS — M25.572 SINUS TARSITIS OF LEFT FOOT: Primary | ICD-10-CM

## 2024-03-28 DIAGNOSIS — M25.372 ANKLE INSTABILITY, LEFT: ICD-10-CM

## 2024-03-28 PROCEDURE — 3079F DIAST BP 80-89 MM HG: CPT | Mod: S$GLB,,, | Performed by: PODIATRIST

## 2024-03-28 PROCEDURE — 1159F MED LIST DOCD IN RCRD: CPT | Mod: S$GLB,,, | Performed by: PODIATRIST

## 2024-03-28 PROCEDURE — 1160F RVW MEDS BY RX/DR IN RCRD: CPT | Mod: S$GLB,,, | Performed by: PODIATRIST

## 2024-03-28 PROCEDURE — 99999 PR PBB SHADOW E&M-EST. PATIENT-LVL III: CPT | Mod: PBBFAC,,, | Performed by: PODIATRIST

## 2024-03-28 PROCEDURE — 3008F BODY MASS INDEX DOCD: CPT | Mod: S$GLB,,, | Performed by: PODIATRIST

## 2024-03-28 PROCEDURE — 99203 OFFICE O/P NEW LOW 30 MIN: CPT | Mod: S$GLB,,, | Performed by: PODIATRIST

## 2024-03-28 PROCEDURE — 3075F SYST BP GE 130 - 139MM HG: CPT | Mod: S$GLB,,, | Performed by: PODIATRIST

## 2024-03-28 RX ORDER — DICLOFENAC SODIUM 75 MG/1
75 TABLET, DELAYED RELEASE ORAL 2 TIMES DAILY
Qty: 60 TABLET | Refills: 1 | Status: SHIPPED | OUTPATIENT
Start: 2024-03-28 | End: 2024-05-27

## 2024-03-31 NOTE — PROGRESS NOTES
Subjective:       Patient ID: Jg Rich is a 33 y.o. male.    Chief Complaint: No chief complaint on file.  Patient presents today with a complaint of ankle and heel pain left patient states he severely injured and sprained his left foot and ankle about 13 years ago he was told it was a bad sprain however he states he has had problems ever since that time at the end of the day when he has been working his foot and ankle is extremely swollen.  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    History reviewed. No pertinent past medical history.  Past Surgical History:   Procedure Laterality Date    APPENDECTOMY      TONSILLECTOMY      TYMPANOSTOMY TUBE PLACEMENT      VASECTOMY Bilateral 3/6/2023    Procedure: VASECTOMY;  Surgeon: Carri Edouard MD;  Location: [facility] OR;  Service: Urology;  Laterality: Bilateral;  1% lidocaine without epi       History reviewed. No pertinent family history.  Social History     Socioeconomic History    Marital status:    Tobacco Use    Smoking status: Former     Types: Cigarettes    Smokeless tobacco: Never   Substance and Sexual Activity    Alcohol use: Never    Drug use: Yes     Types: Marijuana    Sexual activity: Yes     Birth control/protection: None       Current Outpatient Medications   Medication Sig Dispense Refill    diclofenac (VOLTAREN) 75 MG EC tablet Take 1 tablet (75 mg total) by mouth 2 (two) times daily. 60 tablet 1     No current facility-administered medications for this visit.     Review of patient's allergies indicates:  No Known Allergies    Review of Systems   Musculoskeletal:  Positive for arthralgias, gait problem and joint swelling.   All other systems reviewed and are negative.      Objective:      Vitals:    03/28/24 1550   BP: 133/81   BP Location: Right arm  "  Patient Position: Sitting   Pulse: 76   Weight: 81.6 kg (180 lb)   Height: 5' 9" (1.753 m)     Physical Exam  Vitals and nursing note reviewed.   Constitutional:       Appearance: Normal appearance.   Cardiovascular:      Pulses:           Dorsalis pedis pulses are 2+ on the right side and 2+ on the left side.        Posterior tibial pulses are 2+ on the right side and 2+ on the left side.   Pulmonary:      Effort: Pulmonary effort is normal.   Musculoskeletal:         General: Swelling, tenderness, deformity and signs of injury present.      Left foot: Decreased range of motion.        Feet:    Feet:      Right foot:      Protective Sensation: 2 sites tested.  2 sites sensed.      Left foot:      Protective Sensation: 2 sites tested.  2 sites sensed.      Skin integrity: Erythema and warmth present.   Skin:     Capillary Refill: Capillary refill takes less than 2 seconds.      Findings: Erythema present.   Neurological:      General: No focal deficit present.      Mental Status: He is alert.   Psychiatric:         Mood and Affect: Mood normal.         Behavior: Behavior normal.                                Assessment:       1. Sinus tarsitis of left foot    2. Ankle instability, left        Plan:       Patient presents today with a complaint of ankle and heel pain left patient states he severely injured and sprained his left foot and ankle about 13 years ago he was told it was a bad sprain however he states he has had problems ever since that time at the end of the day when he has been working his foot and ankle is extremely swollen.  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  Patient states he does get a jolt that comes up over the top of his foot and ankle up his leg and it feels like it is giving out.  Comprehensive new patient " evaluation was performed today patient does have pain directly overlying the sinus tarsi and overlying the peroneal tendons with apparent instability of the ATF and CF ligaments left.  I was able to review x-rays that were taken in 2012 there were no fractures no signs of dislocation or abnormality noted at that time.  I have ordered new plain film x-rays of the patient's left ankle I advised the patient he is likely going to need an MRI this is likely soft tissue related but I am concerned that there may be some bony involvement with arthritic changes even at the age of 33 because this is a chronic injury.  Patient was started on diclofenac I have dispensed the patient a compression sleeve I want him to wear this at all times especially when he is at work even with his work boot this is going to help to control the swelling and inflammation giving him more support once he is got the x-ray performed we will contact him let him know as to the results of the plain film x-ray that I have ordered today he will likely need an MRI following that to better evaluate this area to determine appropriate treatment plan.  Patient states his right foot and ankle is very stiff in the morning especially after he is worked the day before.  Follow-up pending imaging studies.This note was created using "[employer] voice recognition software that occasionally misinterpreted phrases or words.

## 2024-04-01 ENCOUNTER — TELEPHONE (OUTPATIENT)
Dept: PODIATRY | Facility: CLINIC | Age: 34
End: 2024-04-01
Payer: COMMERCIAL

## 2024-04-01 ENCOUNTER — HOSPITAL ENCOUNTER (OUTPATIENT)
Dept: RADIOLOGY | Facility: HOSPITAL | Age: 34
Discharge: HOME | End: 2024-04-01
Attending: PODIATRIST
Payer: COMMERCIAL

## 2024-04-01 DIAGNOSIS — M25.572 SINUS TARSITIS OF LEFT FOOT: ICD-10-CM

## 2024-04-01 DIAGNOSIS — M25.372 ANKLE INSTABILITY, LEFT: ICD-10-CM

## 2024-04-01 DIAGNOSIS — M25.572 SINUS TARSITIS OF LEFT FOOT: Primary | ICD-10-CM

## 2024-04-01 PROCEDURE — 73610 X-RAY EXAM OF ANKLE: CPT | Mod: TC,LT

## 2024-04-01 PROCEDURE — 73610 X-RAY EXAM OF ANKLE: CPT | Mod: 26,LT,, | Performed by: RADIOLOGY

## 2024-04-05 ENCOUNTER — TELEPHONE (OUTPATIENT)
Dept: PODIATRY | Facility: CLINIC | Age: 34
End: 2024-04-05
Payer: COMMERCIAL

## 2024-04-05 NOTE — TELEPHONE ENCOUNTER
Is it medically urgent for patient to get MRI? He has over 600 dollar balance he will be responsible for.

## 2024-04-08 ENCOUNTER — HOSPITAL ENCOUNTER (OUTPATIENT)
Dept: RADIOLOGY | Facility: HOSPITAL | Age: 34
Discharge: HOME | End: 2024-04-08
Attending: PODIATRIST
Payer: COMMERCIAL

## 2024-04-08 DIAGNOSIS — M25.372 ANKLE INSTABILITY, LEFT: ICD-10-CM

## 2024-04-08 DIAGNOSIS — M25.572 SINUS TARSITIS OF LEFT FOOT: ICD-10-CM

## 2024-04-08 PROCEDURE — 73718 MRI LOWER EXTREMITY W/O DYE: CPT | Mod: 26,LT,, | Performed by: RADIOLOGY

## 2024-04-08 PROCEDURE — 73718 MRI LOWER EXTREMITY W/O DYE: CPT | Mod: TC,LT

## 2024-04-09 ENCOUNTER — OFFICE VISIT (OUTPATIENT)
Dept: PODIATRY | Facility: CLINIC | Age: 34
End: 2024-04-09
Payer: COMMERCIAL

## 2024-04-09 VITALS
HEIGHT: 69 IN | BODY MASS INDEX: 26.64 KG/M2 | WEIGHT: 179.88 LBS | HEART RATE: 59 BPM | DIASTOLIC BLOOD PRESSURE: 64 MMHG | SYSTOLIC BLOOD PRESSURE: 103 MMHG

## 2024-04-09 DIAGNOSIS — M25.572 CHRONIC PAIN OF LEFT ANKLE: ICD-10-CM

## 2024-04-09 DIAGNOSIS — M94.9 CHONDRAL LESION: Primary | ICD-10-CM

## 2024-04-09 DIAGNOSIS — G89.29 CHRONIC PAIN OF LEFT ANKLE: ICD-10-CM

## 2024-04-09 PROCEDURE — 99999 PR PBB SHADOW E&M-EST. PATIENT-LVL IV: CPT | Mod: PBBFAC,,, | Performed by: PODIATRIST

## 2024-04-09 PROCEDURE — 3078F DIAST BP <80 MM HG: CPT | Mod: S$GLB,,, | Performed by: PODIATRIST

## 2024-04-09 PROCEDURE — 99213 OFFICE O/P EST LOW 20 MIN: CPT | Mod: S$GLB,,, | Performed by: PODIATRIST

## 2024-04-09 PROCEDURE — 1160F RVW MEDS BY RX/DR IN RCRD: CPT | Mod: S$GLB,,, | Performed by: PODIATRIST

## 2024-04-09 PROCEDURE — 3074F SYST BP LT 130 MM HG: CPT | Mod: S$GLB,,, | Performed by: PODIATRIST

## 2024-04-09 PROCEDURE — 3008F BODY MASS INDEX DOCD: CPT | Mod: S$GLB,,, | Performed by: PODIATRIST

## 2024-04-09 PROCEDURE — 1159F MED LIST DOCD IN RCRD: CPT | Mod: S$GLB,,, | Performed by: PODIATRIST

## 2024-04-09 NOTE — PROGRESS NOTES
"Subjective:       Patient ID: Jg Rich is a 33 y.o. male.    Chief Complaint: Follow-up (MRI results)  Patient presents today for follow-up of chronic left foot and ankle pain times 13 years.  Patient had an injury 13 years ago to the left foot and ankle that went untreated and it has caused him chronic pain and discomfort he is presenting today to discuss previously ordered MRI.    History reviewed. No pertinent past medical history.  Past Surgical History:   Procedure Laterality Date    APPENDECTOMY      TONSILLECTOMY      TYMPANOSTOMY TUBE PLACEMENT      VASECTOMY Bilateral 3/6/2023    Procedure: VASECTOMY;  Surgeon: Carri Edouard MD;  Location: [facility] OR;  Service: Urology;  Laterality: Bilateral;  1% lidocaine without epi       History reviewed. No pertinent family history.  Social History     Socioeconomic History    Marital status:    Tobacco Use    Smoking status: Former     Types: Cigarettes    Smokeless tobacco: Never   Substance and Sexual Activity    Alcohol use: Never    Drug use: Yes     Types: Marijuana    Sexual activity: Yes     Birth control/protection: None       Current Outpatient Medications   Medication Sig Dispense Refill    diclofenac (VOLTAREN) 75 MG EC tablet Take 1 tablet (75 mg total) by mouth 2 (two) times daily. 60 tablet 1     No current facility-administered medications for this visit.     Review of patient's allergies indicates:  No Known Allergies    Review of Systems   Musculoskeletal:  Positive for arthralgias, gait problem and joint swelling.   All other systems reviewed and are negative.      Objective:      Vitals:    04/09/24 1041   BP: 103/64   BP Location: Left arm   Patient Position: Sitting   Pulse: (!) 59   Weight: 81.6 kg (179 lb 14.3 oz)   Height: 5' 9" (1.753 m)     Physical Exam  Vitals and nursing note reviewed.   Constitutional:       Appearance: Normal appearance.   Cardiovascular:      Pulses:           Dorsalis pedis pulses are 2+ on the " right side and 2+ on the left side.        Posterior tibial pulses are 2+ on the right side and 2+ on the left side.   Pulmonary:      Effort: Pulmonary effort is normal.   Musculoskeletal:         General: Swelling, tenderness, deformity and signs of injury present.      Left foot: Decreased range of motion.        Feet:    Feet:      Right foot:      Protective Sensation: 2 sites tested.  2 sites sensed.      Left foot:      Protective Sensation: 2 sites tested.  2 sites sensed.      Skin integrity: Erythema and warmth present.   Skin:     Capillary Refill: Capillary refill takes less than 2 seconds.      Findings: Erythema present.   Neurological:      General: No focal deficit present.      Mental Status: He is alert.   Psychiatric:         Mood and Affect: Mood normal.         Behavior: Behavior normal.                               MRI Foot (Hindfoot) Left Without Contrast  Order: [id number]  Status: Final result       Visible to patient: Yes (seen)       Next appt: 05/10/2024 at 09:00 AM in Orthopedics (John Hale MD)       Dx: Ankle instability, left; Sinus tarsit...    0 Result Notes  Details    Reading Physician Reading Date Result Priority   Ehsan Curiel MD  [phone] 4/8/2024 Routine     Narrative & Impression  EXAMINATION:  MRI FOOT (HINDFOOT) LEFT WITHOUT CONTRAST     CLINICAL HISTORY:  Foot pain, persistent post-traumatic;  Pain in left ankle and joints of left foot     TECHNIQUE:  MRI ankle performed per routine protocol without contrast.     COMPARISON:  Plain films 04/01/2024.     FINDINGS:  Ligaments: Anterior and posterior inferior tibiofibular ligaments are intact.  Anterior talofibular, calcaneofibular and posterior talofibular ligaments are intact.  Deep and superficial components of deltoid ligament are intact with mild increased signal consistent with a mild sprain.  Spring ligament complex and Lisfranc ligament are intact.     Tendons: Medial ankle flexor and dorsal ankle  extensor tendons are intact.  Mild T2 hyperintense fluid distending the sheath of the peroneus brevis and longus tendons consistent with mild tenosynovitis.  The Achilles tendon is intact.     Joints: No joint effusions. Tibiotalar and subtalar cartilage maintained.     Bones: Heterogeneous subchondral bone marrow edema of the medial talar dome and distal tibia consistent with reactive degenerative edema.  No fracture or infiltrative process.     Miscellaneous: Plantar fascia, sinus tarsi, and tarsal tunnel are unremarkable.     Impression:     1. Mild sprain of the deltoid ligament.  2. Mild peroneus brevis and longus tenosynovitis.  3. Mild reactive degenerative bone marrow edema of the tibiotalar articulation.        Electronically signed by: Ehsan Curiel  Date:                                            04/08/2024  Time:                                           15:25           Exam Ended: 04/08/24 08:30 CDT Last Resulted: 04/08/24 15:25 CDT                              Assessment:       1. Chondral lesion    2. Chronic pain of left ankle        Plan:       Patient presents today for follow-up of chronic left foot and ankle pain times 13 years.  Patient had an injury 13 years ago to the left foot and ankle that went untreated and it has caused him chronic pain and discomfort he is presenting today to discuss previously ordered MRI.  Patient does do a lot of walking standing and climbing at work he states by the time he is part way through the day he has significant swelling and pain he states is deep down inside the ankle joint left.  I did review the MRI at length and in detail with the patient advising the patient he does have an os chondral lesion and degenerative changes within the ankle joint primarily on the talar dome I have advised the patient I would recommend sending him to Dr. Hale who can better evaluate and determine a troch appropriate treatment plan for the patient.  Patient advised this may  be a simple as monitoring the area steroid injections or even surgical intervention it is definitely something that needs to be evaluated by Dr. Hale patient was in understanding and agreement with this.  I did advised the patient this is a progressive process and this will get worse with time especially with the type of work that he is doing.  Patient advised that the soft tissue structures of the ankle joint are intact.  Recommended follow-up as needed.  Referral has been made to Dr. Hale.  This note was created using [employer] voice recognition software that occasionally misinterpreted phrases or words.

## 2024-05-08 DIAGNOSIS — G89.29 CHRONIC PAIN OF LEFT ANKLE: Primary | ICD-10-CM

## 2024-05-08 DIAGNOSIS — M25.572 CHRONIC PAIN OF LEFT ANKLE: Primary | ICD-10-CM

## 2024-05-10 ENCOUNTER — HOSPITAL ENCOUNTER (OUTPATIENT)
Dept: RADIOLOGY | Facility: HOSPITAL | Age: 34
Discharge: HOME | End: 2024-05-10
Attending: ORTHOPAEDIC SURGERY
Payer: COMMERCIAL

## 2024-05-10 ENCOUNTER — OFFICE VISIT (OUTPATIENT)
Dept: ORTHOPEDICS | Facility: CLINIC | Age: 34
End: 2024-05-10
Payer: COMMERCIAL

## 2024-05-10 VITALS — BODY MASS INDEX: 26.64 KG/M2 | WEIGHT: 179.88 LBS | HEIGHT: 69 IN

## 2024-05-10 DIAGNOSIS — G89.29 CHRONIC PAIN OF LEFT ANKLE: ICD-10-CM

## 2024-05-10 DIAGNOSIS — M95.8 OSTEOCHONDRAL DEFECT OF TALUS: Primary | ICD-10-CM

## 2024-05-10 DIAGNOSIS — M94.9 CHONDRAL LESION: ICD-10-CM

## 2024-05-10 DIAGNOSIS — M25.572 CHRONIC PAIN OF LEFT ANKLE: ICD-10-CM

## 2024-05-10 DIAGNOSIS — S93.492A SPRAIN OF ANTERIOR TALOFIBULAR LIGAMENT OF LEFT ANKLE, INITIAL ENCOUNTER: ICD-10-CM

## 2024-05-10 PROCEDURE — 99204 OFFICE O/P NEW MOD 45 MIN: CPT | Mod: 25,S$GLB,, | Performed by: ORTHOPAEDIC SURGERY

## 2024-05-10 PROCEDURE — 73630 X-RAY EXAM OF FOOT: CPT | Mod: TC,PO,LT

## 2024-05-10 PROCEDURE — 20605 DRAIN/INJ JOINT/BURSA W/O US: CPT | Mod: LT,S$GLB,, | Performed by: ORTHOPAEDIC SURGERY

## 2024-05-10 PROCEDURE — 3008F BODY MASS INDEX DOCD: CPT | Mod: CPTII,S$GLB,, | Performed by: ORTHOPAEDIC SURGERY

## 2024-05-10 PROCEDURE — 99999 PR PBB SHADOW E&M-EST. PATIENT-LVL III: CPT | Mod: PBBFAC,,, | Performed by: ORTHOPAEDIC SURGERY

## 2024-05-10 PROCEDURE — 1160F RVW MEDS BY RX/DR IN RCRD: CPT | Mod: CPTII,S$GLB,, | Performed by: ORTHOPAEDIC SURGERY

## 2024-05-10 PROCEDURE — 1159F MED LIST DOCD IN RCRD: CPT | Mod: CPTII,S$GLB,, | Performed by: ORTHOPAEDIC SURGERY

## 2024-05-10 PROCEDURE — 73630 X-RAY EXAM OF FOOT: CPT | Mod: 26,LT,, | Performed by: RADIOLOGY

## 2024-05-10 RX ADMIN — BUPIVACAINE HYDROCHLORIDE 3 ML: 2.5 INJECTION, SOLUTION EPIDURAL; INFILTRATION; INTRACAUDAL at 09:05

## 2024-05-10 NOTE — PROGRESS NOTES
Status/Diagnosis: Left lateral ankle instability; Medial talar dome OCD.  Date of Surgery: none  Date of Injury: 2010  Return visit: 06/14/2024  X-rays on Return: none    Chief Complaint:   Chief Complaint   Patient presents with    Left Foot - Pain     Present History:  Patient presents today via referral from Dr. Rosalino Love   Jg Rich is a 33 y.o. male with complaints of persistent left ankle pain and subjective instability.  Reports falling off of a roof, approximately 10 ft, 14 years ago.  No formal evaluation or treatment at that time.  Patient endorses worsening pain localized to the anterior ankle joint line with complaints of ankle giving way.  Recently seen and evaluated by Dr. Love.  MRI obtained showing a medial talar dome osteochondral lesion.  Presents today for further evaluation and treatment.  Denies any numbness or tingling.  Otherwise healthy.  He does dip one can of tobacco per day.  Works on his feet as a .      No past medical history on file.    Past Surgical History:   Procedure Laterality Date    APPENDECTOMY      TONSILLECTOMY      TYMPANOSTOMY TUBE PLACEMENT      VASECTOMY Bilateral 3/6/2023    Procedure: VASECTOMY;  Surgeon: Carri Edouard MD;  Location: [facility] OR;  Service: Urology;  Laterality: Bilateral;  1% lidocaine without epi         Current Outpatient Medications   Medication Sig    diclofenac (VOLTAREN) 75 MG EC tablet Take 1 tablet (75 mg total) by mouth 2 (two) times daily.     No current facility-administered medications for this visit.       Review of patient's allergies indicates:  No Known Allergies    No family history on file.    Social History     Socioeconomic History    Marital status:    Tobacco Use    Smoking status: Former     Types: Cigarettes    Smokeless tobacco: Never   Substance and Sexual Activity    Alcohol use: Never    Drug use: Yes     Types: Marijuana    Sexual activity: Yes     Birth control/protection:  None       Physical exam:  There were no vitals filed for this visit.  Body mass index is 26.57 kg/m².  General: In no apparent distress; well developed and well nourished.  HEENT: normocephalic; atraumatic.  Cardiovascular: regular rate.  Respiratory: no increased work of breathing.  Musculoskeletal:   Gait: mild antalgic  Inspection:   No gross abnormality noted.  Patient with pain localized to the anteromedial > anterolateral ankle joint line with tenderness on deep palpation.  Also moderate tenderness of the ATFL origin.  No significant tenderness over the course of the peroneals.  No medial based ankle swelling or tenderness noted.    1+ laxity with anterior drawer testing.  Equivocal varus talar tilt testing.  4/5 strength with attempted inversion and eversion.  5/5 dorsiflexion and plantar flexion.  Good single limb heel rise on the right.  Difficulty performing on the left.  Despite weakness as outlined above, no significant pain.  No pain referable to the foot.  No pain or laxity with Lisfranc stress.  Silfverskiold: Negative  Alignment:  Knee: neutral               Ankle: neutral              Hindfoot: neutral              Forefoot: neutral   Strength:              Dorsiflexion 5/5  Plantar flexion 5/5  Inversion 4/5  Eversion 4/5  Sensation:              Good sensation on monofilament testing  ROM:              Ankle: near full with pain at extremes              Subtalar: near full with pain at extremes  Pulses: Palpable pedal pulse                   Imaging Studies/Outside documentation:  I have ordered/reviewed/interpreted the following images/outside documentation:  1. Weight-bearing 3-views of Left foot and ankle:   On my independent review, no acute bony abnormality noted.  There is a large lucency involving the medial shoulder of the talar dome.  Moderate decreased calcaneal pitch.  Talonavicular uncoverage within normal limits.  Ankle mortise does remain congruent.    2. MRI Left ankle w/o contrast  on 04/08/2024:  On my independent review, significant bone marrow edema on T2 imaging involving the medial talar dome of the level of the shoulder.  The osteochondral lesion is difficult to visualize given thick cut but measures approximately 8 x 7 mm.  Could potentially be consistent with fissuring.  Also with a separate area of increased signal on T2 imaging involving the posteromedial tibial plafond.  No obvious osteochondral lesion.  ATFL appears intact.  Mild-to-moderate sprain of the CFL and deltoid ligament.        Assessment:  Jg Rich is a 33 y.o. male with Left lateral ankle instability; Medial talar dome OCD.     Plan:   Clinical and radiographic findings were discussed.  Operative versus nonoperative treatment options were described.    Patient does have what appears to be symptomatic osteochondral lesion involving the medial talar dome.  Diagnostic/therapeutic lidocaine/bupivacaine mixture intra-articular corticosteroid injection performed today without complication.  Patient tolerated this well.  See procedure note for details.    Patient will notify us regarding level of improvement early next week.  Also provided with an ASO lace-up ankle brace for extrinsic stability.  We will initiate physical therapy given current inversion/eversion weakness.    Plans for return to clinic on 06/14 for repeat evaluation.  Pending patient progress, we did discuss the potential for surgical intervention to include but not limited to ankle arthroscopy with osteochondral lesion debridement and lateral ligament repair versus reconstruction.  Patient voiced understanding.  All questions were answered.    We performed a custom orthotic/brace fitting, adjusting and training with the patient. The patient demonstrated understanding and proper care. This was performed for 15 minutes.    This note was created using voice recognition software and may contain grammatical errors.

## 2024-05-13 RX ORDER — BUPIVACAINE HYDROCHLORIDE 2.5 MG/ML
3 INJECTION, SOLUTION EPIDURAL; INFILTRATION; INTRACAUDAL
Status: DISCONTINUED | OUTPATIENT
Start: 2024-05-10 | End: 2024-05-13 | Stop reason: HOSPADM

## 2024-05-13 NOTE — PROCEDURES
Intermediate Joint Aspiration/Injection: L ankle    Date/Time: 5/10/2024 9:00 AM    Performed by: John Hale MD  Authorized by: John Hale MD    Consent Done?:  Yes (Verbal)  Indications:  Diagnostic evaluation, joint swelling and pain  Site marked: The procedure site was marked    Timeout: Prior to procedure the correct patient, procedure, and site was verified      Location:  Ankle  Site:  L ankle  Prep: Patient was prepped and draped in usual sterile fashion    Ultrasonic Guidance for needle placement: No  Needle size:  25 G  Approach:  Anteromedial  Medications:  3 mL BUPivacaine (PF) 0.25% (2.5 mg/ml) 0.25 % (2.5 mg/mL)  Patient tolerance:  Patient tolerated the procedure well with no immediate complications

## 2024-05-22 ENCOUNTER — CLINICAL SUPPORT (OUTPATIENT)
Dept: REHABILITATION | Facility: HOSPITAL | Age: 34
End: 2024-05-22
Payer: COMMERCIAL

## 2024-05-22 DIAGNOSIS — Z74.09 IMPAIRED FUNCTIONAL MOBILITY, BALANCE, GAIT, AND ENDURANCE: Primary | ICD-10-CM

## 2024-05-22 PROCEDURE — 97161 PT EVAL LOW COMPLEX 20 MIN: CPT | Mod: PN

## 2024-05-22 PROCEDURE — 97140 MANUAL THERAPY 1/> REGIONS: CPT | Mod: PN

## 2024-05-22 NOTE — PLAN OF CARE
[facility]   Physical Therapy Initial Evaluation      Name: Jg Rich  Clinic Number: [id number]    Therapy Diagnosis:   Encounter Diagnosis   Name Primary?    Impaired functional mobility, balance, gait, and endurance Yes        Physician: John Hale MD    Physician Orders: PT Eval and Treat   Medical Diagnosis from Referral: M95.8 (ICD-10-CM) - Osteochondral defect of talus S93.492A (ICD-10-CM) - Sprain of anterior talofibular ligament of left ankle, initial encounter  Evaluation Date: 5/22/2024  Authorization Period Expiration: 12/31/24  Plan of Care Expiration: 8/22/24  Progress Note Due: 7/1/24  Date of Surgery: none  Visit # / Visits authorized: 1/ 1   FOTO: 1/ 3    Precautions: Standard and Fall     Time In: 255  Time Out: 335  Total Billable Time: 40 minutes    Subjective     Date of onset: 10-12 years     History of current condition - Tripp reports: doctor send him in here to work on strengthening for left ankle and foot. Sprained ankle years ago where he feels it has not healed properly. Feels like ankle is jammed together. Has to climb ladder for work.     Falls: none     Imaging: see medical chart for complete list    Prior Therapy: none  Social History: lives with their spouse and children   Occupation: works at a chemical plant  Prior Level of Function: I  Current Level of Function: I with weakness and pain    Pain:  Current 3/10, worst 7/10, best 2/10   Location: left ankles and feet    Description: Aching, Burning, Throbbing, Tight, and Hot  Aggravating Factors: Standing, Walking, and Lifting  Easing Factors: ice    Patients goals: ' to get stronger. '      Medical History:   No past medical history on file.    Surgical History:   Jg Rich  has a past surgical history that includes Appendectomy; Tonsillectomy; Tympanostomy tube placement; and Vasectomy (Bilateral, 3/6/2023).    Medications:   Jg has a current medication list which includes the  following prescription(s): diclofenac.    Allergies:   Review of patient's allergies indicates:  No Known Allergies     Objective         Right Left Comment   DF: 5/5 4/5    PF: 4+/5 4-/5    Eversion: 4/5 3+/5    Inversion: 4+/5 4-/5    1st MTP Ext: 4/5 4/5    1st MTP Flex: 4/5 4/5        Intake Outcome Measure for FOTO LEFS Survey    Therapist reviewed FOTO scores for Jg Rich on 5/22/2024.   FOTO report - see Media section or FOTO account episode details.    Intake Score: 25%       Heel raises single leg- left unable    Treatment     Total Treatment time (time-based codes) separate from Evaluation: 30 minutes     Tripp received the treatments listed below:        manual therapy techniques: Joint mobilizations and Soft tissue Mobilization were applied to the:  10 minutes, including:  ASTYM completed to left ankle and top of foot to increase mobility at this time.     Patient Education and Home Exercises     Education provided:   - HEP, POC.     Written Home Exercises Provided: Patient instructed to cont prior HEP. Exercises were reviewed and Tripp was able to demonstrate them prior to the end of the session.  Tripp demonstrated good  understanding of the education provided. See EMR under Patient Instructions for exercises provided during therapy sessions.    Assessment     Jg is a 33 y.o. male referred to outpatient Physical Therapy with a medical diagnosis of left ankle pain. Patient presents with decreased gait due to non properly healed left ankle. Pt sprained his left ankle several years ago where it was never healed completely. Pt now has deficits in strength and mobility with increased pain post being on feet for several hours at work. Pt is in need of strengthening at this time to prevent further decline in functional status.      Patient prognosis is Good.   Patient will benefit from skilled outpatient Physical Therapy to address the deficits stated above and in the chart below, provide patient  /family education, and to maximize patientt's level of independence.     Plan of care discussed with patient: Yes  Patient's spiritual, cultural and educational needs considered and patient is agreeable to the plan of care and goals as stated below:     Anticipated Barriers for therapy: na    Medical Necessity is demonstrated by the following  History  Co-morbidities and personal factors that may impact the plan of care [x] LOW: no personal factors / co-morbidities  [] MODERATE: 1-2 personal factors / co-morbidities  [] HIGH: 3+ personal factors / co-morbidities    Moderate / High Support Documentation:   Co-morbidities affecting plan of care:     Personal Factors:   na     Examination  Body Structures and Functions, activity limitations and participation restrictions that may impact the plan of care [x] LOW: addressing 1-2 elements  [] MODERATE: 3+ elements  [] HIGH: 4+ elements (please support below)    Moderate / High Support Documentation: na     Clinical Presentation [x] LOW: stable  [] MODERATE: Evolving  [] HIGH: Unstable     Decision Making/ Complexity Score: low       Goals:  Short Term Goals: 3 weeks   Pt will be compliant with HEP.  Pt will improve quad strength by 1/2 grade to allow for strength to go up and down stairs.   Pt will improve sit <>  30 sec to at least 20 allow for increased functional mobility.  Pt will complete SL calf raises at least 20x to demonstrate improvement in quality of life.    Long Term Goals: 6 weeks   Pt will be independent with HEP to allow for increased functional tasks.  Pt will improve FOTO by 10 % to demonstrate improvement in quality of life.  Pt will improve hip flexor strength by 1/2 grade to allow for normalized body mechanics.   Plan     Plan of care Certification: 5/22/2024 to 8/22/24.    Outpatient Physical Therapy 2 times weekly for 6 weeks to include the following interventions: Electrical Stimulation  , Gait Training, Manual Therapy, Moist Heat/ Ice,  Neuromuscular Re-ed, Patient Education, Therapeutic Activities, Therapeutic Exercise, and Ultrasound.     Guerda Kay, PT        Physician's Signature: _________________________________________ Date: ________________

## 2024-05-23 NOTE — PLAN OF CARE
PT met face to face with Maurisio Horner PTA to discuss patient's treatment plan and progress towards established goals.  Treatment will be continued as described in initial report/eval and progress notes.  Patient will be seen by physical therapist every sixth visit and minimally once per month.    Additional information: NA

## 2024-05-28 ENCOUNTER — CLINICAL SUPPORT (OUTPATIENT)
Dept: REHABILITATION | Facility: HOSPITAL | Age: 34
End: 2024-05-28
Payer: COMMERCIAL

## 2024-05-28 DIAGNOSIS — Z74.09 IMPAIRED FUNCTIONAL MOBILITY, BALANCE, GAIT, AND ENDURANCE: Primary | ICD-10-CM

## 2024-05-28 PROCEDURE — 97110 THERAPEUTIC EXERCISES: CPT | Mod: PN,CQ

## 2024-05-28 NOTE — PROGRESS NOTES
[facility]   Physical Therapy Treatment Note      Name: Jg Rich  Clinic Number: [id number]    Therapy Diagnosis:   Encounter Diagnosis   Name Primary?    Impaired functional mobility, balance, gait, and endurance Yes     Physician: John Hale MD    Visit Date: 5/28/2024    Physician Orders: PT Eval and Treat   Medical Diagnosis from Referral: M95.8 (ICD-10-CM) - Osteochondral defect of talus S93.492A (ICD-10-CM) - Sprain of anterior talofibular ligament of left ankle, initial encounter  Evaluation Date: 5/22/2024  Authorization Period Expiration: 12/31/24  Plan of Care Expiration: 8/22/24  Progress Note Due: 7/1/24  Date of Surgery: none  Visit # / Visits authorized: 1/ 12 + eval   FOTO: 1/ 3     Precautions: Standard and Fall      Time In: 400  Time Out: 440  Total Billable Time: 40 minutes    PTA Visit #: 1/5       Subjective     Patient reports: no new compliants.  He was compliant with home exercise program.  Response to previous treatment: good  Functional change: none    Pain: 3/10  Location: left ankles     Objective      Objective Measures updated at progress report unless specified.     Treatment     Tripp received the treatments listed below:      therapeutic exercises to develop strength, endurance, and ROM for 40 minutes including:  R bike x 12 min  Sitting ankle pumps x 20 with green t band   Inversion x 20 with green t band   Eversion x 20 with green t band   Dorsi flex x 20 with green t band   ABC's   Toe crunching  KT taping the tarsal tunnel left ankle: discussed precautions and patient understood        manual therapy techniques: Myofacial release and Soft tissue Mobilization were applied to the: left ankle for 0 minutes, including:      neuromuscular re-education activities to improve: Balance, Coordination, and Proprioception for 0 minutes. The following activities were included:      direct contact modalities after being cleared for contraindications:      supervised modalities after being cleared for contradictions:     Patient Education and Home Exercises       Education provided:   - KT taping    Written Home Exercises Provided: Patient instructed to cont prior HEP. Exercises were reviewed and Tripp was able to demonstrate them prior to the end of the session.  Tripp demonstrated good  understanding of the education provided. See Electronic Medical Record under Patient Instructions for exercises provided during therapy sessions    Assessment     Jg is a 33 y.o. male referred to outpatient Physical Therapy with a medical diagnosis of left ankle pain. Patient presents with decreased gait due to non properly healed left ankle. Pt sprained his left ankle several years ago where it was never healed completely. Pt now has deficits in strength and mobility with increased pain post being on feet for several hours at work. Pt is in need of strengthening at this time to prevent further decline in functional status.      Tripp Is progressing well towards his goals.   Patient prognosis is Good.     Patient will continue to benefit from skilled outpatient physical therapy to address the deficits listed in the problem list box on initial evaluation, provide pt/family education and to maximize pt's level of independence in the home and community environment.     Patient's spiritual, cultural and educational needs considered and pt agreeable to plan of care and goals.     Anticipated barriers to physical therapy: none    Goals:   Short Term Goals: 3 weeks   Pt will be compliant with HEP.  Pt will improve quad strength by 1/2 grade to allow for strength to go up and down stairs.   Pt will improve sit <>  30 sec to at least 20 allow for increased functional mobility.  Pt will complete SL calf raises at least 20x to demonstrate improvement in quality of life.     Long Term Goals: 6 weeks   Pt will be independent with HEP to allow for increased functional tasks.  Pt  will improve FOTO by 10 % to demonstrate improvement in quality of life.  Pt will improve hip flexor strength by 1/2 grade to allow for normalized body mechanics.     Plan     Plan of care Certification: 5/22/2024 to 8/22/24.     Outpatient Physical Therapy 2 times weekly for 6 weeks to include the following interventions: Electrical Stimulation  , Gait Training, Manual Therapy, Moist Heat/ Ice, Neuromuscular Re-ed, Patient Education, Therapeutic Activities, Therapeutic Exercise, and Ultrasound.     Maurisio Horner, PTA

## 2024-05-31 ENCOUNTER — CLINICAL SUPPORT (OUTPATIENT)
Dept: REHABILITATION | Facility: HOSPITAL | Age: 34
End: 2024-05-31
Payer: COMMERCIAL

## 2024-05-31 DIAGNOSIS — Z74.09 IMPAIRED FUNCTIONAL MOBILITY, BALANCE, GAIT, AND ENDURANCE: Primary | ICD-10-CM

## 2024-05-31 PROCEDURE — 97110 THERAPEUTIC EXERCISES: CPT | Mod: PN,CQ

## 2024-05-31 NOTE — PROGRESS NOTES
"[facility]   Physical Therapy Treatment Note      Name: Jg Rich  Clinic Number: [id number]    Therapy Diagnosis:   Encounter Diagnosis   Name Primary?    Impaired functional mobility, balance, gait, and endurance Yes     Physician: John Hale MD    Visit Date: 5/31/2024    Physician Orders: PT Eval and Treat   Medical Diagnosis from Referral: M95.8 (ICD-10-CM) - Osteochondral defect of talus S93.492A (ICD-10-CM) - Sprain of anterior talofibular ligament of left ankle, initial encounter  Evaluation Date: 5/22/2024  Authorization Period Expiration: 12/31/24  Plan of Care Expiration: 8/22/24  Progress Note Due: 7/1/24  Date of Surgery: none  Visit # / Visits authorized: 2 / 12 + eval   FOTO: 1/ 3     Precautions: Standard and Fall      Time In: 7:00 AM  Time Out: 7:50 AM  Total Billable Time: 40 minutes    PTA Visit #: 2/5       Subjective     Patient reports: My ankle is a little stiff today. The tape really helped.   He was compliant with home exercise program.  Response to previous treatment: good  Functional change: none    Pain: 1/10  Location: left ankle     Objective      Objective Measures updated at progress report unless specified.     Treatment     Tripp received the treatments listed below:      therapeutic exercises to develop strength, endurance, and ROM for 40 minutes including:  R bike level 5 x 15 min  Heel Raises x 15  Step-ups on 6" step x 15  Heel Cord stretch on wedge 3 x 30 sec  4 Way ankle strengthening w/ GTB x 15 each   ABC's x 1   Toe crunches x 2 min  KT taping to the tarsal tunnel left ankle: discussed precautions and patient understood        manual therapy techniques: Myofacial release and Soft tissue Mobilization were applied to the: left ankle for 0 minutes, including:      neuromuscular re-education activities to improve: Balance, Coordination, and Proprioception for 0 minutes. The following activities were included:      direct contact " modalities after being cleared for contraindications:     supervised modalities after being cleared for contradictions:     Patient Education and Home Exercises       Education provided:   - KT taping    Written Home Exercises Provided: Patient instructed to cont prior HEP. Exercises were reviewed and Tripp was able to demonstrate them prior to the end of the session.  Tripp demonstrated good  understanding of the education provided. See Electronic Medical Record under Patient Instructions for exercises provided during therapy sessions    Assessment     Jg is a 33 y.o. male referred to outpatient Physical Therapy with a medical diagnosis of left ankle pain. Patient presents with decreased gait due to non properly healed left ankle. Pt sprained his left ankle several years ago where it was never healed completely. Pt now has deficits in strength and mobility with increased pain post being on feet for several hours at work. Pt is in need of strengthening at this time to prevent further decline in functional status.      Tripp Is progressing well towards his goals.   Patient prognosis is Good.     Patient will continue to benefit from skilled outpatient physical therapy to address the deficits listed in the problem list box on initial evaluation, provide pt/family education and to maximize pt's level of independence in the home and community environment.     Patient's spiritual, cultural and educational needs considered and pt agreeable to plan of care and goals.     Anticipated barriers to physical therapy: none    Goals:   Short Term Goals: 3 weeks   Pt will be compliant with HEP.  Pt will improve quad strength by 1/2 grade to allow for strength to go up and down stairs.   Pt will improve sit <>  30 sec to at least 20 allow for increased functional mobility.  Pt will complete SL calf raises at least 20x to demonstrate improvement in quality of life.     Long Term Goals: 6 weeks   Pt will be independent  with HEP to allow for increased functional tasks.  Pt will improve FOTO by 10 % to demonstrate improvement in quality of life.  Pt will improve hip flexor strength by 1/2 grade to allow for normalized body mechanics.     Plan     Plan of care Certification: 5/22/2024 to 8/22/24.     Outpatient Physical Therapy 2 times weekly for 6 weeks to include the following interventions: Electrical Stimulation  , Gait Training, Manual Therapy, Moist Heat/ Ice, Neuromuscular Re-ed, Patient Education, Therapeutic Activities, Therapeutic Exercise, and Ultrasound.     Jonathan Favre, PTA

## 2024-06-04 ENCOUNTER — CLINICAL SUPPORT (OUTPATIENT)
Dept: REHABILITATION | Facility: HOSPITAL | Age: 34
End: 2024-06-04
Payer: COMMERCIAL

## 2024-06-04 DIAGNOSIS — Z74.09 IMPAIRED FUNCTIONAL MOBILITY, BALANCE, GAIT, AND ENDURANCE: Primary | ICD-10-CM

## 2024-06-04 PROCEDURE — 97110 THERAPEUTIC EXERCISES: CPT | Mod: PN,CQ

## 2024-06-04 NOTE — PROGRESS NOTES
"[facility]   Physical Therapy Treatment Note      Name: Jg Rich  Clinic Number: [id number]    Therapy Diagnosis:   Encounter Diagnosis   Name Primary?    Impaired functional mobility, balance, gait, and endurance Yes     Physician: John Hale MD    Visit Date: 6/4/2024    Physician Orders: PT Eval and Treat   Medical Diagnosis from Referral: M95.8 (ICD-10-CM) - Osteochondral defect of talus S93.492A (ICD-10-CM) - Sprain of anterior talofibular ligament of left ankle, initial encounter  Evaluation Date: 5/22/2024  Authorization Period Expiration: 12/31/24  Plan of Care Expiration: 8/22/24  Progress Note Due: 7/1/24  Date of Surgery: none  Visit # / Visits authorized: 3 / 12 + eval   FOTO: 1/ 3     Precautions: Standard and Fall      Time In: 440  Time Out: 520  Total Billable Time: 40 minutes    PTA Visit #: 2/5       Subjective     Patient reports: being very pleased with therapy.  It is helping.  He was compliant with home exercise program.  Response to previous treatment: good  Functional change: none    Pain: 1/10  Location: left ankle     Objective      Objective Measures updated at progress report unless specified.     Treatment     Tripp received the treatments listed below:      therapeutic exercises to develop strength, endurance, and ROM for 40 minutes including:  R bike level 5 x 15 min  Heel Raises x 15  Step-ups on 6" step x 15  Heel Cord stretch on wedge 3 x 30 sec  4 Way ankle strengthening w/ GTB x 15 each   ABC's x 1   Toe crunches x 2 min  KT taping to the tarsal tunnel left ankle: discussed precautions and patient understood        manual therapy techniques: Myofacial release and Soft tissue Mobilization were applied to the: left ankle for 0 minutes, including:      neuromuscular re-education activities to improve: Balance, Coordination, and Proprioception for 0 minutes. The following activities were included:      direct contact modalities after being " cleared for contraindications:     supervised modalities after being cleared for contradictions:     Patient Education and Home Exercises       Education provided:   - KT taping    Written Home Exercises Provided: Patient instructed to cont prior HEP. Exercises were reviewed and Tripp was able to demonstrate them prior to the end of the session.  Tripp demonstrated good  understanding of the education provided. See Electronic Medical Record under Patient Instructions for exercises provided during therapy sessions    Assessment     Jg is a 33 y.o. male referred to outpatient Physical Therapy with a medical diagnosis of left ankle pain. Patient presents with decreased gait due to non properly healed left ankle. Pt sprained his left ankle several years ago where it was never healed completely. Pt now has deficits in strength and mobility with increased pain post being on feet for several hours at work. Pt is in need of strengthening at this time to prevent further decline in functional status.      Tripp Is progressing well towards his goals.   Patient prognosis is Good.     Patient will continue to benefit from skilled outpatient physical therapy to address the deficits listed in the problem list box on initial evaluation, provide pt/family education and to maximize pt's level of independence in the home and community environment.     Patient's spiritual, cultural and educational needs considered and pt agreeable to plan of care and goals.     Anticipated barriers to physical therapy: none    Goals:   Short Term Goals: 3 weeks   Pt will be compliant with HEP.  Pt will improve quad strength by 1/2 grade to allow for strength to go up and down stairs.   Pt will improve sit <>  30 sec to at least 20 allow for increased functional mobility.  Pt will complete SL calf raises at least 20x to demonstrate improvement in quality of life.     Long Term Goals: 6 weeks   Pt will be independent with HEP to allow for  increased functional tasks.  Pt will improve FOTO by 10 % to demonstrate improvement in quality of life.  Pt will improve hip flexor strength by 1/2 grade to allow for normalized body mechanics.     Plan     Plan of care Certification: 5/22/2024 to 8/22/24.     Outpatient Physical Therapy 2 times weekly for 6 weeks to include the following interventions: Electrical Stimulation  , Gait Training, Manual Therapy, Moist Heat/ Ice, Neuromuscular Re-ed, Patient Education, Therapeutic Activities, Therapeutic Exercise, and Ultrasound.     Maurisio Horner, PTA

## 2024-06-06 ENCOUNTER — CLINICAL SUPPORT (OUTPATIENT)
Dept: REHABILITATION | Facility: HOSPITAL | Age: 34
End: 2024-06-06
Payer: COMMERCIAL

## 2024-06-06 DIAGNOSIS — Z74.09 IMPAIRED FUNCTIONAL MOBILITY, BALANCE, GAIT, AND ENDURANCE: Primary | ICD-10-CM

## 2024-06-06 PROCEDURE — 97110 THERAPEUTIC EXERCISES: CPT | Mod: PN,CQ

## 2024-06-06 NOTE — PROGRESS NOTES
"[facility]   Physical Therapy Treatment Note      Name: Jg Rich  Clinic Number: [id number]    Therapy Diagnosis:   Encounter Diagnosis   Name Primary?    Impaired functional mobility, balance, gait, and endurance Yes     Physician: John Hale MD    Visit Date: 6/6/2024    Physician Orders: PT Eval and Treat   Medical Diagnosis from Referral: M95.8 (ICD-10-CM) - Osteochondral defect of talus S93.492A (ICD-10-CM) - Sprain of anterior talofibular ligament of left ankle, initial encounter  Evaluation Date: 5/22/2024  Authorization Period Expiration: 12/31/24  Plan of Care Expiration: 8/22/24  Progress Note Due: 7/1/24  Date of Surgery: none  Visit # / Visits authorized: 4 / 12 + eval   FOTO: 1/ 3     Precautions: Standard and Fall      Time In: 430  Time Out: 510  Total Billable Time: 40 minutes    PTA Visit #: 4/5       Subjective     Patient reports: being very pleased with therapy.  It is helping.  He was compliant with home exercise program.  Response to previous treatment: good  Functional change: none    Pain: 1/10  Location: left ankle     Objective      Objective Measures updated at progress report unless specified.     Treatment     Tripp received the treatments listed below:      therapeutic exercises to develop strength, endurance, and ROM for 40 minutes including:  R bike level 5 x 15 min  Heel Raises x 15  Step-ups on 6" step x 15  Heel Cord stretch on wedge 3 x 30 sec  4 Way ankle strengthening w/ GTB x 15 each   ABC's x 1   Toe crunches x 2 min  KT taping to the tarsal tunnel left ankle: discussed precautions and patient understood        manual therapy techniques: Myofacial release and Soft tissue Mobilization were applied to the: left ankle for 0 minutes, including:      neuromuscular re-education activities to improve: Balance, Coordination, and Proprioception for 0 minutes. The following activities were included:      direct contact modalities after being " cleared for contraindications:     supervised modalities after being cleared for contradictions:     Patient Education and Home Exercises       Education provided:   - KT taping    Written Home Exercises Provided: Patient instructed to cont prior HEP. Exercises were reviewed and Tripp was able to demonstrate them prior to the end of the session.  Tripp demonstrated good  understanding of the education provided. See Electronic Medical Record under Patient Instructions for exercises provided during therapy sessions    Assessment     Jg is a 33 y.o. male referred to outpatient Physical Therapy with a medical diagnosis of left ankle pain. Patient presents with decreased gait due to non properly healed left ankle. Pt sprained his left ankle several years ago where it was never healed completely. Pt now has deficits in strength and mobility with increased pain post being on feet for several hours at work. Pt is in need of strengthening at this time to prevent further decline in functional status.      Tripp Is progressing well towards his goals.   Patient prognosis is Good.     Patient will continue to benefit from skilled outpatient physical therapy to address the deficits listed in the problem list box on initial evaluation, provide pt/family education and to maximize pt's level of independence in the home and community environment.     Patient's spiritual, cultural and educational needs considered and pt agreeable to plan of care and goals.     Anticipated barriers to physical therapy: none    Goals:   Short Term Goals: 3 weeks   Pt will be compliant with HEP.  Pt will improve quad strength by 1/2 grade to allow for strength to go up and down stairs.   Pt will improve sit <>  30 sec to at least 20 allow for increased functional mobility.  Pt will complete SL calf raises at least 20x to demonstrate improvement in quality of life.     Long Term Goals: 6 weeks   Pt will be independent with HEP to allow for  increased functional tasks.  Pt will improve FOTO by 10 % to demonstrate improvement in quality of life.  Pt will improve hip flexor strength by 1/2 grade to allow for normalized body mechanics.     Plan     Plan of care Certification: 5/22/2024 to 8/22/24.     Outpatient Physical Therapy 2 times weekly for 6 weeks to include the following interventions: Electrical Stimulation  , Gait Training, Manual Therapy, Moist Heat/ Ice, Neuromuscular Re-ed, Patient Education, Therapeutic Activities, Therapeutic Exercise, and Ultrasound.     Maurisio Horner, PTA

## (undated) DEVICE — BLADE SURG #15 CARBON STEEL

## (undated) DEVICE — NDL SAFETY 25G X 1.5 ECLIPSE

## (undated) DEVICE — GAUGE FLUFF X-SUPER 36X36 2PLY

## (undated) DEVICE — CAUTERY TIP 2 3/4

## (undated) DEVICE — DRAPE MINOR FEN 98X77X121IN

## (undated) DEVICE — TRAY SKIN SCRUB DRY PREMIUM

## (undated) DEVICE — SKIN MARKER STER DUAL TIP

## (undated) DEVICE — SEE L#120831

## (undated) DEVICE — PENCIL ROCKER SWITCH 10FT CORD

## (undated) DEVICE — GLOVE SURG ULTRA TOUCH 6

## (undated) DEVICE — PAD CURAD NONADH 3X4IN

## (undated) DEVICE — PACK SIRUS BASIC V SURG STRL

## (undated) DEVICE — ADHESIVE DERMABOND ADVANCED

## (undated) DEVICE — ELECTRODE REM PLYHSV RETURN 9

## (undated) DEVICE — SUPPORTER ADLT A3 3 IN. WB ME

## (undated) DEVICE — COVER SURG LIGHT HANDLE

## (undated) DEVICE — NDL HYPODERMIC BLUNT 18G 1.5IN

## (undated) DEVICE — DRESSING TELFA STRL 4X3 LF

## (undated) DEVICE — SYR LUER LOCK STERILE 10ML

## (undated) DEVICE — SPONGE GAUZE 16PLY 4X4

## (undated) DEVICE — SUT CHROMIC 3-0 SH 27IN GUT